# Patient Record
Sex: MALE | Race: WHITE | NOT HISPANIC OR LATINO
[De-identification: names, ages, dates, MRNs, and addresses within clinical notes are randomized per-mention and may not be internally consistent; named-entity substitution may affect disease eponyms.]

---

## 2021-02-16 ENCOUNTER — APPOINTMENT (OUTPATIENT)
Dept: INTERNAL MEDICINE | Facility: CLINIC | Age: 41
End: 2021-02-16
Payer: COMMERCIAL

## 2021-02-16 ENCOUNTER — NON-APPOINTMENT (OUTPATIENT)
Age: 41
End: 2021-02-16

## 2021-02-16 VITALS
TEMPERATURE: 97.1 F | OXYGEN SATURATION: 97 % | HEIGHT: 73 IN | BODY MASS INDEX: 26.27 KG/M2 | DIASTOLIC BLOOD PRESSURE: 85 MMHG | SYSTOLIC BLOOD PRESSURE: 133 MMHG | WEIGHT: 198.25 LBS | HEART RATE: 77 BPM

## 2021-02-16 DIAGNOSIS — E04.1 NONTOXIC SINGLE THYROID NODULE: ICD-10-CM

## 2021-02-16 DIAGNOSIS — R73.9 HYPERGLYCEMIA, UNSPECIFIED: ICD-10-CM

## 2021-02-16 DIAGNOSIS — Z83.49 FAMILY HISTORY OF OTHER ENDOCRINE, NUTRITIONAL AND METABOLIC DISEASES: ICD-10-CM

## 2021-02-16 DIAGNOSIS — E03.9 HYPOTHYROIDISM, UNSPECIFIED: ICD-10-CM

## 2021-02-16 PROCEDURE — 99385 PREV VISIT NEW AGE 18-39: CPT | Mod: 25

## 2021-02-16 PROCEDURE — 93000 ELECTROCARDIOGRAM COMPLETE: CPT

## 2021-02-16 PROCEDURE — 99072 ADDL SUPL MATRL&STAF TM PHE: CPT

## 2021-02-16 PROCEDURE — 99406 BEHAV CHNG SMOKING 3-10 MIN: CPT | Mod: NC

## 2021-02-16 PROCEDURE — 99213 OFFICE O/P EST LOW 20 MIN: CPT | Mod: 25

## 2021-02-16 PROCEDURE — 36415 COLL VENOUS BLD VENIPUNCTURE: CPT

## 2021-02-16 NOTE — HEALTH RISK ASSESSMENT
[Good] : ~his/her~  mood as  good [] : Yes [16-20] : 16-20 [Yes] : Yes [2 - 4 times a month (2 pts)] : 2-4 times a month (2 points) [3 or 4 (1 pt)] : 3 or 4  (1 point) [Never (0 pts)] : Never (0 points) [No falls in past year] : Patient reported no falls in the past year [0] : 2) Feeling down, depressed, or hopeless: Not at all (0) [Patient declined Low Dose CT Scan] : Patient declined Low Dose CT Scan [Patient declined Retinal Exam] : Patient declined Retinal Exam. [HIV Test offered] : HIV Test offered [Hepatitis C test offered] : Hepatitis C test offered [Audit-CScore] : 3 [[id number]] : 0

## 2021-02-16 NOTE — HISTORY OF PRESENT ILLNESS
[FreeTextEntry1] : physical exam [de-identified] : Mr. KAYDEN HILL is a 41 year male current everyday cigarette smoker, history of alcoholism, comes to the office for physical exam.  Patient also complains of intermittent ALBRECHT over to past few months. Patient also has additional complaints of GERD. Patient denies fever, cough. No other complaints at this time.

## 2021-02-16 NOTE — PLAN
[FreeTextEntry1] : In regards to patients Physical exam, routine blood work drawn, will review results with patient.\par \par In regards to patient's additional complaint of ALBRECHT, patient asymptomatic at time of visit, EKG performed in office was sinus rhythm, patient was referred to cardiologist for further evaluation and management\par \par In regards to patient's additional complaint of GERD, patient was started on PPI and referred to Gastroenterologist\par \par NATHALIE- patient was referred to pulmonologist\par \par Patient is not ready to quit at this time.  \par \par Counseling included abnormal lab results, differential diagnoses, treatment options, risks and benefits, lifestyle changes, current condition, medications, and dose adjustments. \par The patient was interactive, attentive, asked questions, and verbalized understanding \par \par

## 2021-02-17 LAB
25(OH)D3 SERPL-MCNC: 12 NG/ML
ALBUMIN SERPL ELPH-MCNC: 4.9 G/DL
ALP BLD-CCNC: 62 U/L
ALT SERPL-CCNC: 51 U/L
ANION GAP SERPL CALC-SCNC: 15 MMOL/L
AST SERPL-CCNC: 34 U/L
BASOPHILS # BLD AUTO: 0.03 K/UL
BASOPHILS NFR BLD AUTO: 0.5 %
BILIRUB SERPL-MCNC: 0.4 MG/DL
BUN SERPL-MCNC: 13 MG/DL
CALCIUM SERPL-MCNC: 9.8 MG/DL
CHLORIDE SERPL-SCNC: 98 MMOL/L
CHOLEST SERPL-MCNC: 245 MG/DL
CO2 SERPL-SCNC: 24 MMOL/L
CREAT SERPL-MCNC: 1.19 MG/DL
EOSINOPHIL # BLD AUTO: 0.17 K/UL
EOSINOPHIL NFR BLD AUTO: 3.1 %
ESTIMATED AVERAGE GLUCOSE: 111 MG/DL
FERRITIN SERPL-MCNC: 281 NG/ML
GLUCOSE SERPL-MCNC: 88 MG/DL
HBA1C MFR BLD HPLC: 5.5 %
HCT VFR BLD CALC: 47 %
HCV AB SER QL: NONREACTIVE
HCV S/CO RATIO: 0.05 S/CO
HDLC SERPL-MCNC: 75 MG/DL
HGB BLD-MCNC: 15.2 G/DL
HIV1+2 AB SPEC QL IA.RAPID: NONREACTIVE
IMM GRANULOCYTES NFR BLD AUTO: 0.2 %
IRON SATN MFR SERPL: 32 %
IRON SERPL-MCNC: 97 UG/DL
LDLC SERPL CALC-MCNC: 146 MG/DL
LYMPHOCYTES # BLD AUTO: 1.77 K/UL
LYMPHOCYTES NFR BLD AUTO: 31.8 %
MAN DIFF?: NORMAL
MCHC RBC-ENTMCNC: 31.1 PG
MCHC RBC-ENTMCNC: 32.3 GM/DL
MCV RBC AUTO: 96.3 FL
MONOCYTES # BLD AUTO: 0.5 K/UL
MONOCYTES NFR BLD AUTO: 9 %
NEUTROPHILS # BLD AUTO: 3.09 K/UL
NEUTROPHILS NFR BLD AUTO: 55.4 %
NONHDLC SERPL-MCNC: 170 MG/DL
PLATELET # BLD AUTO: 271 K/UL
POTASSIUM SERPL-SCNC: 4.8 MMOL/L
PROT SERPL-MCNC: 7.6 G/DL
PSA SERPL-MCNC: 0.72 NG/ML
RBC # BLD: 4.88 M/UL
RBC # FLD: 12 %
SODIUM SERPL-SCNC: 136 MMOL/L
TIBC SERPL-MCNC: 302 UG/DL
TRANSFERRIN SERPL-MCNC: 236 MG/DL
TRIGL SERPL-MCNC: 119 MG/DL
TSH SERPL-ACNC: 0.53 UIU/ML
UIBC SERPL-MCNC: 206 UG/DL
WBC # FLD AUTO: 5.57 K/UL

## 2021-03-29 ENCOUNTER — APPOINTMENT (OUTPATIENT)
Dept: CARDIOLOGY | Facility: CLINIC | Age: 41
End: 2021-03-29
Payer: COMMERCIAL

## 2021-03-29 ENCOUNTER — NON-APPOINTMENT (OUTPATIENT)
Age: 41
End: 2021-03-29

## 2021-03-29 VITALS
DIASTOLIC BLOOD PRESSURE: 87 MMHG | SYSTOLIC BLOOD PRESSURE: 138 MMHG | TEMPERATURE: 97.2 F | HEIGHT: 73 IN | RESPIRATION RATE: 15 BRPM | WEIGHT: 196 LBS | HEART RATE: 83 BPM | BODY MASS INDEX: 25.98 KG/M2

## 2021-03-29 DIAGNOSIS — Z82.49 FAMILY HISTORY OF ISCHEMIC HEART DISEASE AND OTHER DISEASES OF THE CIRCULATORY SYSTEM: ICD-10-CM

## 2021-03-29 DIAGNOSIS — Z72.89 OTHER PROBLEMS RELATED TO LIFESTYLE: ICD-10-CM

## 2021-03-29 DIAGNOSIS — E78.9 DISORDER OF LIPOPROTEIN METABOLISM, UNSPECIFIED: ICD-10-CM

## 2021-03-29 PROCEDURE — 99072 ADDL SUPL MATRL&STAF TM PHE: CPT

## 2021-03-29 PROCEDURE — 99244 OFF/OP CNSLTJ NEW/EST MOD 40: CPT

## 2021-03-29 PROCEDURE — 93000 ELECTROCARDIOGRAM COMPLETE: CPT

## 2021-03-29 RX ORDER — OMEPRAZOLE 40 MG/1
40 CAPSULE, DELAYED RELEASE ORAL
Qty: 90 | Refills: 3 | Status: DISCONTINUED | COMMUNITY
Start: 2021-02-16 | End: 2021-03-29

## 2021-03-29 RX ORDER — ERGOCALCIFEROL 1.25 MG/1
1.25 MG CAPSULE, LIQUID FILLED ORAL
Qty: 12 | Refills: 3 | Status: DISCONTINUED | COMMUNITY
Start: 2021-02-17 | End: 2021-03-29

## 2021-03-29 NOTE — DISCUSSION/SUMMARY
[FreeTextEntry1] : Patient is a 40yo M with h/o tobacco dependence, EtOH here for cardiac evaluation of dyspnea on exertion. Needs cardiac eval to ensure no underlying structural heart disease, ischemia, or ASD given iRBBB. Could also be related to significant NATHALIE, he is getting tested for this as well. Pulm eval pending\par \par 1. Echo and EST to evaluate dyspnea/chest discomfort\par 2. Pulm eval and sleep study pending\par 3. Counselled for more than 3 minutes on smoking cessation\par 4. EtOH cessation\par 5. Recommend aggressive diet and lifestyle modifications for elevated BP and lipids \par 6. Follow up after testing

## 2021-03-29 NOTE — HISTORY OF PRESENT ILLNESS
[FreeTextEntry1] : Patient is a 40yo M with h/o tobacco dependence, EtOH here for cardiac evaluation of dyspnea on exertion. Started a few months back. Feels SOB walking his dog fairly short distances or going up stairs. Gets some discomfort in chest, has hard time describing and somewhat vague. Loud snoring at night as well. States girlfriend has noted he stops breathing. Patient denies PND/orthopnea/edema/palpitations/syncope/claudication. Has morning cough. All symptoms seemed to have started this past year. HAs had both doses COVID vaccine. \par \par Works as . Drinks several drinks (beer)  about 5 days per week. \par \par ROS: GI negative, all others negative

## 2021-03-31 ENCOUNTER — APPOINTMENT (OUTPATIENT)
Dept: PULMONOLOGY | Facility: CLINIC | Age: 41
End: 2021-03-31
Payer: COMMERCIAL

## 2021-03-31 VITALS
DIASTOLIC BLOOD PRESSURE: 78 MMHG | RESPIRATION RATE: 14 BRPM | HEIGHT: 73 IN | OXYGEN SATURATION: 97 % | HEART RATE: 74 BPM | SYSTOLIC BLOOD PRESSURE: 128 MMHG | WEIGHT: 196 LBS | BODY MASS INDEX: 25.98 KG/M2 | TEMPERATURE: 97.4 F

## 2021-03-31 DIAGNOSIS — Z86.39 PERSONAL HISTORY OF OTHER ENDOCRINE, NUTRITIONAL AND METABOLIC DISEASE: ICD-10-CM

## 2021-03-31 DIAGNOSIS — Z80.8 FAMILY HISTORY OF MALIGNANT NEOPLASM OF OTHER ORGANS OR SYSTEMS: ICD-10-CM

## 2021-03-31 PROCEDURE — 99072 ADDL SUPL MATRL&STAF TM PHE: CPT

## 2021-03-31 PROCEDURE — 99204 OFFICE O/P NEW MOD 45 MIN: CPT | Mod: 25

## 2021-03-31 PROCEDURE — 99406 BEHAV CHNG SMOKING 3-10 MIN: CPT

## 2021-03-31 NOTE — HISTORY OF PRESENT ILLNESS
[Current] : current [Never] : never [Initial Evaluation] : an initial evaluation of [Excessive Daytime Sleepiness] : excessive daytime sleepiness [Witnessed Apnea During Sleep] : witnessed apnea during sleep [Witnessed Gasping During Sleep] : witnessed gasping during sleep [Snoring] : snoring [Unrefreshing Sleep] : unrefreshing sleep [Sleepy When Sedentary] : sleepy when sedentary [Currently Experiencing] : The patient is currently experiencing symptoms. [None] : The patient is not currently being treated for this problem [TextBox_4] : Patient c/o SOBOE but is otherwise without associated respiratory complaints. \par He is a current 3 ppw smoker for 20 years but has quit intermittently. \par He c/o AM cough and feels he may have PNDS.\par  [TextBox_11] : .5 [TextBox_13] : 20

## 2021-03-31 NOTE — PHYSICAL EXAM
[No Acute Distress] : no acute distress [Well Nourished] : well nourished [Well Developed] : well developed [Normal Appearance] : normal appearance [Supple] : supple [Normal Rate/Rhythm] : normal rate/rhythm [Normal S1, S2] : normal s1, s2 [No Murmurs] : no murmurs [No Resp Distress] : no resp distress [No Acc Muscle Use] : no acc muscle use [Normal Rhythm and Effort] : normal rhythm and effort [Clear to Auscultation Bilaterally] : clear to auscultation bilaterally [No Abnormalities] : no abnormalities [Benign] : benign [Not Tender] : not tender [Soft] : soft [Normal Gait] : normal gait [No Clubbing] : no clubbing [No Edema] : no edema [No Focal Deficits] : no focal deficits [Oriented x3] : oriented x3

## 2021-03-31 NOTE — REVIEW OF SYSTEMS
[Postnasal Drip] : postnasal drip [SOB on Exertion] : sob on exertion [Seasonal Allergies] : seasonal allergies [GERD] : gerd [Fever] : no fever [Chills] : no chills [Nasal Congestion] : no nasal congestion [Sinus Problems] : no sinus problems [Cough] : no cough [Chest Tightness] : no chest tightness [Sputum] : no sputum [Dyspnea] : no dyspnea [Pleuritic Pain] : no pleuritic pain [Wheezing] : no wheezing [Chest Discomfort] : no chest discomfort [Edema] : no edema [Palpitations] : no palpitations [Syncope] : no syncope [Hay Fever] : no hay fever [Abdominal Pain] : no abdominal pain [Nausea] : no nausea [Vomiting] : no vomiting [Diarrhea] : no diarrhea [Constipation] : no constipation [Back Pain] : no back pain [Anemia] : no anemia [Headache] : no headache [Seizures] : no seizures [Dizziness] : no dizziness [Numbness] : no numbness [Paralysis] : no paralysis [Confusion] : no confusion [Depression] : no depression [Anxiety] : no anxiety [Diabetes] : no diabetes [Thyroid Problem] : no thyroid problem

## 2021-03-31 NOTE — CONSULT LETTER
[Dear  ___] : Dear  [unfilled], [Consult Letter:] : I had the pleasure of evaluating your patient, [unfilled]. [Please see my note below.] : Please see my note below. [Consult Closing:] : Thank you very much for allowing me to participate in the care of this patient.  If you have any questions, please do not hesitate to contact me. [Sincerely,] : Sincerely, [FreeTextEntry3] : Rishi Godinez MD, FCCP, D. ABSM\par Pulmonary and Sleep Medicine\par [facility] at [place]

## 2021-03-31 NOTE — COUNSELING
[Potential consequences of obesity discussed] : Potential consequences of obesity discussed [Benefits of weight loss discussed] : Benefits of weight loss discussed [Encouraged to increase physical activity] : Encouraged to increase physical activity [Cessation strategies including cessation program discussed] : Cessation strategies including cessation program discussed [Risk of tobacco use and health benefits of smoking cessation discussed] : Risk of tobacco use and health benefits of smoking cessation discussed [Use of nicotine replacement therapies and other medications discussed] : Use of nicotine replacement therapies and other medications discussed [Encouraged to pick a quit date and identify support needed to quit] : Encouraged to pick a quit date and identify support needed to quit [Tobacco Use Cessation Intermediate Greater Than 3 Minutes Up to 10 Minutes] : Tobacco Use Cessation Intermediate Greater Than 3 Minutes Up to 10 Minutes [FreeTextEntry1] : 4

## 2021-03-31 NOTE — DISCUSSION/SUMMARY
[FreeTextEntry1] : \par #1. Will schedule PFTs in near future to assess lung function with Covid testing prior to PFTs\par #2. The patient does not appear to require chronic BD therapy at this time\par #3. Diet and exercise for weight loss\par #4. SOBOE is likely related to weight or deconditioning \par #5. CXR to evaluate SOBOE \par #6. PSG to evaluate for possible NATHALIE. Consider HST if not approved. \par #7. F/u after PSG/HST with CXR and PFTs\par #8. Flonase nasal spray for post nasal drip as needed \par #9. Pt had both Covid vaccines\par #10. Reviewed risks of exposure and symptoms of Covid-19 virus, including how the virus is spread and precautions to avoid dale virus.\par \par Patient's questions were answered and patient appears to understand these recommendations

## 2021-04-16 ENCOUNTER — OUTPATIENT (OUTPATIENT)
Dept: OUTPATIENT SERVICES | Facility: HOSPITAL | Age: 41
LOS: 1 days | End: 2021-04-16
Payer: COMMERCIAL

## 2021-04-16 DIAGNOSIS — G47.33 OBSTRUCTIVE SLEEP APNEA (ADULT) (PEDIATRIC): ICD-10-CM

## 2021-04-16 PROCEDURE — 95810 POLYSOM 6/> YRS 4/> PARAM: CPT | Mod: 26

## 2021-04-16 PROCEDURE — 95810 POLYSOM 6/> YRS 4/> PARAM: CPT

## 2021-04-23 ENCOUNTER — OUTPATIENT (OUTPATIENT)
Dept: OUTPATIENT SERVICES | Facility: HOSPITAL | Age: 41
LOS: 1 days | End: 2021-04-23
Payer: COMMERCIAL

## 2021-04-23 ENCOUNTER — APPOINTMENT (OUTPATIENT)
Dept: RADIOLOGY | Facility: CLINIC | Age: 41
End: 2021-04-23

## 2021-04-23 ENCOUNTER — APPOINTMENT (OUTPATIENT)
Dept: DISASTER EMERGENCY | Facility: CLINIC | Age: 41
End: 2021-04-23

## 2021-04-23 DIAGNOSIS — R06.00 DYSPNEA, UNSPECIFIED: ICD-10-CM

## 2021-04-23 PROCEDURE — 71046 X-RAY EXAM CHEST 2 VIEWS: CPT

## 2021-04-23 PROCEDURE — 71046 X-RAY EXAM CHEST 2 VIEWS: CPT | Mod: 26

## 2021-04-25 LAB — SARS-COV-2 N GENE NPH QL NAA+PROBE: NOT DETECTED

## 2021-04-26 ENCOUNTER — APPOINTMENT (OUTPATIENT)
Dept: PULMONOLOGY | Facility: CLINIC | Age: 41
End: 2021-04-26
Payer: COMMERCIAL

## 2021-04-26 ENCOUNTER — NON-APPOINTMENT (OUTPATIENT)
Age: 41
End: 2021-04-26

## 2021-04-26 VITALS — TEMPERATURE: 97.6 F | HEIGHT: 72.5 IN | BODY MASS INDEX: 26.39 KG/M2 | WEIGHT: 197 LBS

## 2021-04-26 VITALS
SYSTOLIC BLOOD PRESSURE: 115 MMHG | HEART RATE: 57 BPM | DIASTOLIC BLOOD PRESSURE: 60 MMHG | RESPIRATION RATE: 16 BRPM | OXYGEN SATURATION: 97 %

## 2021-04-26 PROCEDURE — 94010 BREATHING CAPACITY TEST: CPT

## 2021-04-26 PROCEDURE — 94729 DIFFUSING CAPACITY: CPT

## 2021-04-26 PROCEDURE — 99072 ADDL SUPL MATRL&STAF TM PHE: CPT

## 2021-04-26 PROCEDURE — 99214 OFFICE O/P EST MOD 30 MIN: CPT | Mod: 25

## 2021-04-26 PROCEDURE — 94727 GAS DIL/WSHOT DETER LNG VOL: CPT

## 2021-04-26 PROCEDURE — 85018 HEMOGLOBIN: CPT | Mod: QW

## 2021-04-26 NOTE — CONSULT LETTER
[Dear  ___] : Dear  [unfilled], [Consult Letter:] : I had the pleasure of evaluating your patient, [unfilled]. [Please see my note below.] : Please see my note below. [Consult Closing:] : Thank you very much for allowing me to participate in the care of this patient.  If you have any questions, please do not hesitate to contact me. [Sincerely,] : Sincerely, [FreeTextEntry3] : Rishi Godinez MD, FCCP, D. ABSM\par Pulmonary and Sleep Medicine\par [facility] at [place]

## 2021-04-26 NOTE — DISCUSSION/SUMMARY
[FreeTextEntry1] : \par #1. PFTs performed today are essentially normal.\par #2. The patient does not appear to require chronic BD therapy at this time\par #3. SOBOE is likely related to weight or deconditioning given normal PFTs\par #4. Diet and exercise for weight loss \par #5. CXR to evaluate SOBOE was clear\par #6. Start autoCPAP therapy to treat mild NATHALIE with an AHI of 5.2; could consider therapy for PLMD though denies significant RLS symptoms though most PLMs were not associated with arousals\par #7. F/u one month after starting CPAP therapy \par #8. Flonase nasal spray for post nasal drip as needed \par #9. Pt had both Covid vaccines\par #10. Stressed importance of complete smoking cessation\par #11. Reviewed risks of exposure and symptoms of Covid-19 virus, including how the virus is spread and precautions to avoid dale virus.\par \par Patient's questions were answered and patient appears to understand these recommendations

## 2021-04-26 NOTE — HISTORY OF PRESENT ILLNESS
[Current] : current [Never] : never [Follow-Up - Routine Clinic] : a routine clinic follow-up of [Excessive Daytime Sleepiness] : excessive daytime sleepiness [Witnessed Apnea During Sleep] : witnessed apnea during sleep [Witnessed Gasping During Sleep] : witnessed gasping during sleep [Snoring] : snoring [Unrefreshing Sleep] : unrefreshing sleep [Sleepy When Sedentary] : sleepy when sedentary [Currently Experiencing] : The patient is currently experiencing symptoms. [None] : The patient is not currently being treated for this problem [TextBox_4] : Patient c/o SOBOE but is otherwise without associated respiratory complaints. \par He is a current 3 ppw smoker for 20 years but has quit intermittently. \par He c/o AM cough and feels he may have PNDS.\par  [TextBox_11] : .5 [TextBox_13] : 20

## 2021-04-26 NOTE — COUNSELING
[Risk of tobacco use and health benefits of smoking cessation discussed] : Risk of tobacco use and health benefits of smoking cessation discussed [Cessation strategies including cessation program discussed] : Cessation strategies including cessation program discussed [Use of nicotine replacement therapies and other medications discussed] : Use of nicotine replacement therapies and other medications discussed [Encouraged to pick a quit date and identify support needed to quit] : Encouraged to pick a quit date and identify support needed to quit [Potential consequences of obesity discussed] : Potential consequences of obesity discussed [Benefits of weight loss discussed] : Benefits of weight loss discussed [Encouraged to increase physical activity] : Encouraged to increase physical activity [FreeTextEntry1] : 4

## 2021-05-17 ENCOUNTER — APPOINTMENT (OUTPATIENT)
Dept: CARDIOLOGY | Facility: CLINIC | Age: 41
End: 2021-05-17
Payer: COMMERCIAL

## 2021-05-17 PROCEDURE — 93306 TTE W/DOPPLER COMPLETE: CPT

## 2021-05-17 PROCEDURE — 99072 ADDL SUPL MATRL&STAF TM PHE: CPT

## 2021-05-17 PROCEDURE — 93015 CV STRESS TEST SUPVJ I&R: CPT

## 2021-05-26 ENCOUNTER — APPOINTMENT (OUTPATIENT)
Dept: CARDIOLOGY | Facility: CLINIC | Age: 41
End: 2021-05-26
Payer: COMMERCIAL

## 2021-05-26 VITALS
DIASTOLIC BLOOD PRESSURE: 77 MMHG | WEIGHT: 195 LBS | SYSTOLIC BLOOD PRESSURE: 123 MMHG | TEMPERATURE: 97.9 F | OXYGEN SATURATION: 97 % | HEIGHT: 73 IN | HEART RATE: 69 BPM | BODY MASS INDEX: 25.84 KG/M2

## 2021-05-26 PROCEDURE — 99214 OFFICE O/P EST MOD 30 MIN: CPT

## 2021-05-26 PROCEDURE — 99072 ADDL SUPL MATRL&STAF TM PHE: CPT

## 2021-05-26 NOTE — DISCUSSION/SUMMARY
[FreeTextEntry1] : Patient is a 42yo M with h/o tobacco dependence, EtOH here for cardiac follow up of dyspnea . \par -EST negative, excellent functional capacity\par -UNremarkable echo other than focal calcification of aortic valve and mild MR. Surveillance only\par \par 1. Recommend 30 minutes moderate intensity aerobic activity 5 days per week \par 2. Recommend 30 minutes moderate intensity aerobic activity 5 days per week \par 3. Counselled for more than 3 minutes on smoking cessation\par 4. EtOH cessation\par 5. annual follow up \par 6. Regular PMd and pulm follow up

## 2021-05-26 NOTE — HISTORY OF PRESENT ILLNESS
[FreeTextEntry1] : Patient is a 42yo M with h/o tobacco dependence, EtOH here for cardiac follow upof dyspnea on exertion. Started a few months back. Feels SOB walking his dog fairly short distances or going up stairs. Gets some discomfort in chest, has hard time describing and somewhat vague. Loud snoring at night as well. States girlfriend has noted he stops breathing. Patient denies PND/orthopnea/edema/palpitations/syncope/claudication. Has morning cough. All symptoms seemed to have started this past year. HAs had both doses COVID vaccine. Patient underwent cardiac testing after last visit. STarted on CPAP as well. \par \par Works as . Drinks several drinks (beer)  about 5 days per week. \par \par ROS: GI and  negative

## 2021-06-15 ENCOUNTER — APPOINTMENT (OUTPATIENT)
Dept: PULMONOLOGY | Facility: CLINIC | Age: 41
End: 2021-06-15
Payer: COMMERCIAL

## 2021-06-15 VITALS
WEIGHT: 190 LBS | BODY MASS INDEX: 25.18 KG/M2 | TEMPERATURE: 97.4 F | DIASTOLIC BLOOD PRESSURE: 80 MMHG | SYSTOLIC BLOOD PRESSURE: 130 MMHG | OXYGEN SATURATION: 97 % | HEART RATE: 67 BPM | HEIGHT: 73 IN

## 2021-06-15 PROCEDURE — 99072 ADDL SUPL MATRL&STAF TM PHE: CPT

## 2021-06-15 PROCEDURE — 99406 BEHAV CHNG SMOKING 3-10 MIN: CPT

## 2021-06-15 PROCEDURE — 99214 OFFICE O/P EST MOD 30 MIN: CPT | Mod: 25

## 2021-06-15 NOTE — CONSULT LETTER
[Dear  ___] : Dear  [unfilled], [Consult Letter:] : I had the pleasure of evaluating your patient, [unfilled]. [Please see my note below.] : Please see my note below. [Consult Closing:] : Thank you very much for allowing me to participate in the care of this patient.  If you have any questions, please do not hesitate to contact me. [Sincerely,] : Sincerely, [FreeTextEntry3] : Rishi Godinez MD, FCCP, D. ABSM\par Pulmonary and Sleep Medicine\par [facility] at [place]

## 2021-06-15 NOTE — COUNSELING
[Risk of tobacco use and health benefits of smoking cessation discussed] : Risk of tobacco use and health benefits of smoking cessation discussed [Cessation strategies including cessation program discussed] : Cessation strategies including cessation program discussed [Use of nicotine replacement therapies and other medications discussed] : Use of nicotine replacement therapies and other medications discussed [Encouraged to pick a quit date and identify support needed to quit] : Encouraged to pick a quit date and identify support needed to quit [Tobacco Use Cessation Intermediate Greater Than 3 Minutes Up to 10 Minutes] : Tobacco Use Cessation Intermediate Greater Than 3 Minutes Up to 10 Minutes [Potential consequences of obesity discussed] : Potential consequences of obesity discussed [Benefits of weight loss discussed] : Benefits of weight loss discussed [Encouraged to increase physical activity] : Encouraged to increase physical activity [FreeTextEntry1] : 4

## 2021-06-15 NOTE — DISCUSSION/SUMMARY
[FreeTextEntry1] : \par #1. PFTs performed previously were essentially normal.\par #2. The patient does not appear to require chronic BD therapy at this time\par #3. SOBOE is likely related to weight or deconditioning given normal PFTs\par #4. Diet and exercise for weight loss \par #5. CXR to evaluate SOBOE was clear\par #6. Continue autoCPAP therapy to treat mild NATHALIE with an AHI of 5.2; could consider therapy for PLMD though denies significant RLS symptoms though most PLMs were not associated with arousals\par #7. F/u 3 months with compliance  \par #8. Flonase nasal spray for post nasal drip as needed \par #9. Pt had both Covid vaccines\par #10. Stressed importance of complete smoking cessation\par #11. Reviewed risks of exposure and symptoms of Covid-19 virus, including how the virus is spread and precautions to avoid dale virus.\par \par Patient's questions were answered and patient appears to understand these recommendations

## 2021-06-17 ENCOUNTER — APPOINTMENT (OUTPATIENT)
Dept: GASTROENTEROLOGY | Facility: CLINIC | Age: 41
End: 2021-06-17
Payer: COMMERCIAL

## 2021-06-17 VITALS
HEIGHT: 73 IN | SYSTOLIC BLOOD PRESSURE: 128 MMHG | DIASTOLIC BLOOD PRESSURE: 82 MMHG | TEMPERATURE: 97.5 F | OXYGEN SATURATION: 98 % | BODY MASS INDEX: 25.45 KG/M2 | HEART RATE: 68 BPM | WEIGHT: 192 LBS | RESPIRATION RATE: 14 BRPM

## 2021-06-17 DIAGNOSIS — Z78.9 OTHER SPECIFIED HEALTH STATUS: ICD-10-CM

## 2021-06-17 PROCEDURE — 99072 ADDL SUPL MATRL&STAF TM PHE: CPT

## 2021-06-17 PROCEDURE — 99204 OFFICE O/P NEW MOD 45 MIN: CPT

## 2021-06-17 NOTE — PHYSICAL EXAM
[General Appearance - Alert] : alert [General Appearance - In No Acute Distress] : in no acute distress [Sclera] : the sclera and conjunctiva were normal [PERRL With Normal Accommodation] : pupils were equal in size, round, and reactive to light [Extraocular Movements] : extraocular movements were intact [Outer Ear] : the ears and nose were normal in appearance [Oropharynx] : the oropharynx was normal [Neck Appearance] : the appearance of the neck was normal [Jugular Venous Distention Increased] : there was no jugular-venous distention [Neck Cervical Mass (___cm)] : no neck mass was observed [Thyroid Diffuse Enlargement] : the thyroid was not enlarged [Thyroid Nodule] : there were no palpable thyroid nodules [Auscultation Breath Sounds / Voice Sounds] : lungs were clear to auscultation bilaterally [Heart Rate And Rhythm] : heart rate was normal and rhythm regular [Heart Sounds] : normal S1 and S2 [Heart Sounds Gallop] : no gallops [Heart Sounds Pericardial Friction Rub] : no pericardial rub [Murmurs] : no murmurs [Bowel Sounds] : normal bowel sounds [Abdomen Soft] : soft [] : no hepato-splenomegaly [Abdomen Tenderness] : non-tender [Abdomen Mass (___ Cm)] : no abdominal mass palpated [Nail Clubbing] : no clubbing  or cyanosis of the fingernails [Skin Color & Pigmentation] : normal skin color and pigmentation [Skin Turgor] : normal skin turgor [No Focal Deficits] : no focal deficits [Oriented To Time, Place, And Person] : oriented to person, place, and time [Impaired Insight] : insight and judgment were intact [Affect] : the affect was normal

## 2021-06-17 NOTE — HISTORY OF PRESENT ILLNESS
[de-identified] : This is a 41-year-old man presenting for evaluation for uncontrolled GERD and heartburn.  Patient reports a long history of acid reflux and underwent an EGD approximately 10 years ago that he reports was unremarkable.  He reports nearly having symptoms daily, is aware that spicy foods and alcohol are triggers.  He was prescribed omeprazole by his primary care doctor, which he never took.  He denies any unintentional weight loss, rectal bleeding, dysphagia, or melena.

## 2021-06-17 NOTE — ASSESSMENT
[FreeTextEntry1] : Counseled patient to quit smoking.  Advised that he start taking pantoprazole 40 mg once daily, and schedule EGD to rule out Pascual's esophagus.

## 2021-07-29 ENCOUNTER — TRANSCRIPTION ENCOUNTER (OUTPATIENT)
Age: 41
End: 2021-07-29

## 2021-07-30 ENCOUNTER — APPOINTMENT (OUTPATIENT)
Dept: GASTROENTEROLOGY | Facility: GI CENTER | Age: 41
End: 2021-07-30
Payer: COMMERCIAL

## 2021-07-30 ENCOUNTER — OUTPATIENT (OUTPATIENT)
Dept: OUTPATIENT SERVICES | Facility: HOSPITAL | Age: 41
LOS: 1 days | End: 2021-07-30
Payer: COMMERCIAL

## 2021-07-30 ENCOUNTER — RESULT REVIEW (OUTPATIENT)
Age: 41
End: 2021-07-30

## 2021-07-30 DIAGNOSIS — K29.80 DUODENITIS W/OUT BLEEDING: ICD-10-CM

## 2021-07-30 DIAGNOSIS — R10.13 EPIGASTRIC PAIN: ICD-10-CM

## 2021-07-30 DIAGNOSIS — K20.80 OTHER ESOPHAGITIS WITHOUT BLEEDING: ICD-10-CM

## 2021-07-30 PROCEDURE — 43239 EGD BIOPSY SINGLE/MULTIPLE: CPT

## 2021-07-30 PROCEDURE — 88342 IMHCHEM/IMCYTCHM 1ST ANTB: CPT | Mod: 26

## 2021-07-30 PROCEDURE — 88305 TISSUE EXAM BY PATHOLOGIST: CPT | Mod: 26

## 2021-07-30 PROCEDURE — 88305 TISSUE EXAM BY PATHOLOGIST: CPT

## 2021-07-30 PROCEDURE — 88342 IMHCHEM/IMCYTCHM 1ST ANTB: CPT

## 2021-08-04 LAB — SURGICAL PATHOLOGY STUDY: SIGNIFICANT CHANGE UP

## 2021-09-22 ENCOUNTER — APPOINTMENT (OUTPATIENT)
Dept: PULMONOLOGY | Facility: CLINIC | Age: 41
End: 2021-09-22
Payer: COMMERCIAL

## 2021-09-22 VITALS
OXYGEN SATURATION: 97 % | BODY MASS INDEX: 26.39 KG/M2 | SYSTOLIC BLOOD PRESSURE: 120 MMHG | HEART RATE: 62 BPM | WEIGHT: 200 LBS | DIASTOLIC BLOOD PRESSURE: 60 MMHG

## 2021-09-22 PROCEDURE — 99214 OFFICE O/P EST MOD 30 MIN: CPT | Mod: 25

## 2021-09-22 PROCEDURE — 99406 BEHAV CHNG SMOKING 3-10 MIN: CPT

## 2021-09-22 NOTE — CONSULT LETTER
[Dear  ___] : Dear  [unfilled], [Consult Letter:] : I had the pleasure of evaluating your patient, [unfilled]. [Please see my note below.] : Please see my note below. [Consult Closing:] : Thank you very much for allowing me to participate in the care of this patient.  If you have any questions, please do not hesitate to contact me. [Sincerely,] : Sincerely, [FreeTextEntry3] : Rishi Godinez MD, FCCP, D. ABSM\par Pulmonary and Sleep Medicine\par [facility] at [place]

## 2021-09-22 NOTE — DISCUSSION/SUMMARY
[FreeTextEntry1] : \par #1. PFTs performed previously were essentially normal.\par #2. The patient does not appear to require chronic BD therapy at this time\par #3. SOBOE is likely related to weight or deconditioning given normal PFTs\par #4. Diet and exercise for weight loss \par #5. CXR to evaluate SOBOE was clear\par #6. Continue autoCPAP therapy to treat mild NATHALIE with an AHI of 5.2; could consider therapy for PLMD though denies significant RLS symptoms though most PLMs were not associated with arousals\par #7. F/u 4 months with compliance; encouraged improved compliance\par #8. Flonase nasal spray for post nasal drip as needed \par #9. Pt had both Covid vaccines\par #10. Stressed importance of complete smoking cessation\par #11. Reviewed risks of exposure and symptoms of Covid-19 virus, including how the virus is spread and precautions to avoid dale virus.\par \par Patient's questions were answered and patient appears to understand these recommendations

## 2021-09-22 NOTE — REASON FOR VISIT
[Follow-Up] : a follow-up visit [Sleep Apnea] : sleep apnea [Shortness of Breath] : shortness of breath [Nicotine Dependence] : nicotine dependence

## 2021-09-22 NOTE — COUNSELING
[Risk of tobacco use and health benefits of smoking cessation discussed] : Risk of tobacco use and health benefits of smoking cessation discussed [Cessation strategies including cessation program discussed] : Cessation strategies including cessation program discussed [Use of nicotine replacement therapies and other medications discussed] : Use of nicotine replacement therapies and other medications discussed [Encouraged to pick a quit date and identify support needed to quit] : Encouraged to pick a quit date and identify support needed to quit [Potential consequences of obesity discussed] : Potential consequences of obesity discussed [Benefits of weight loss discussed] : Benefits of weight loss discussed [Encouraged to increase physical activity] : Encouraged to increase physical activity [Tobacco Use Cessation Intermediate Greater Than 3 Minutes Up to 10 Minutes] : Tobacco Use Cessation Intermediate Greater Than 3 Minutes Up to 10 Minutes [FreeTextEntry1] : 4

## 2021-09-22 NOTE — PHYSICAL EXAM
[No Acute Distress] : no acute distress [Well Nourished] : well nourished [Well Developed] : well developed [Normal Appearance] : normal appearance [Supple] : supple [Normal Rate/Rhythm] : normal rate/rhythm [Normal S1, S2] : normal s1, s2 [No Murmurs] : no murmurs [No Resp Distress] : no resp distress [No Acc Muscle Use] : no acc muscle use [Normal Rhythm and Effort] : normal rhythm and effort [Clear to Auscultation Bilaterally] : clear to auscultation bilaterally [No Abnormalities] : no abnormalities [Benign] : benign [Not Tender] : not tender [Soft] : soft [No Clubbing] : no clubbing [Normal Gait] : normal gait [No Edema] : no edema [No Focal Deficits] : no focal deficits [Oriented x3] : oriented x3

## 2022-01-20 ENCOUNTER — APPOINTMENT (OUTPATIENT)
Dept: PULMONOLOGY | Facility: CLINIC | Age: 42
End: 2022-01-20

## 2022-05-23 ENCOUNTER — NON-APPOINTMENT (OUTPATIENT)
Age: 42
End: 2022-05-23

## 2022-05-23 ENCOUNTER — APPOINTMENT (OUTPATIENT)
Dept: CARDIOLOGY | Facility: CLINIC | Age: 42
End: 2022-05-23
Payer: COMMERCIAL

## 2022-05-23 VITALS
HEART RATE: 64 BPM | DIASTOLIC BLOOD PRESSURE: 64 MMHG | SYSTOLIC BLOOD PRESSURE: 122 MMHG | BODY MASS INDEX: 26.24 KG/M2 | RESPIRATION RATE: 16 BRPM | OXYGEN SATURATION: 97 % | HEIGHT: 73 IN | WEIGHT: 198 LBS

## 2022-05-23 DIAGNOSIS — Z82.49 FAMILY HISTORY OF ISCHEMIC HEART DISEASE AND OTHER DISEASES OF THE CIRCULATORY SYSTEM: ICD-10-CM

## 2022-05-23 DIAGNOSIS — I35.9 NONRHEUMATIC AORTIC VALVE DISORDER, UNSPECIFIED: ICD-10-CM

## 2022-05-23 PROCEDURE — 93000 ELECTROCARDIOGRAM COMPLETE: CPT

## 2022-05-23 PROCEDURE — 99214 OFFICE O/P EST MOD 30 MIN: CPT

## 2022-05-23 RX ORDER — PANTOPRAZOLE 40 MG/1
40 TABLET, DELAYED RELEASE ORAL
Qty: 1 | Refills: 3 | Status: DISCONTINUED | COMMUNITY
Start: 2021-06-17 | End: 2022-05-23

## 2022-05-23 NOTE — DISCUSSION/SUMMARY
[FreeTextEntry1] : Patient is a 43yo M with h/o tobacco dependence, EtOH here for cardiac follow up of dyspnea . \par -EST negative, excellent functional capacity\par -UNremarkable echo other than focal calcification of aortic valve and mild MR. Surveillance only\par \par 1. Recommend 30 minutes moderate intensity aerobic activity 5 days per week \par 2. Regular pulm follow up, not tolerating CPAP but NATHALIE only mild\par 3. Counselled for more than 3 minutes on smoking cessation\par 4. EtOH cessation\par 5. annual follow up , consider repeat echo then \par 6. High HDL and LDL, no indicaiton for statin at this time. Continue to monitor. NOt amenable to meds regardless at this time\par

## 2022-05-23 NOTE — ASSESSMENT
[FreeTextEntry1] : ECG: SR, RSR'\par \par  HDL 75   (2/2021) \par \par ECHO 5/2021:\par 1. Normal LV size and function, EF 55%\par 2. Normal RV/LA/RA \par 3. Trileaflet aortic valve, focal NCC calcification\par 4. Mild MR and PI \par \par EST 5/2021:\par 1. Negative for ischemia\par 2. Normal HR/BP response \par 3. DTS = 13, achieved 15 METS

## 2022-05-23 NOTE — HISTORY OF PRESENT ILLNESS
[FreeTextEntry1] : Patient is a 43yo M with h/o tobacco dependence, EtOH here for cardiac follow up. Patient denies PND/orthopnea/edema/palpitations/syncope/claudication. LAst year had CP that is better this year. Breathing still labored a bit though. HAd normal PFTS with pulm last year and only mild NATHALIE, CPAP not helping. \par \par Works as . Drinks several drinks (beer)  about 5 days per week. \par \par ROS: GI and  negative

## 2022-07-11 ENCOUNTER — APPOINTMENT (OUTPATIENT)
Dept: INTERNAL MEDICINE | Facility: CLINIC | Age: 42
End: 2022-07-11

## 2022-08-30 NOTE — ASSESSMENT
[FreeTextEntry1] : \par \par  HDL 75   (2/2021) \par \par ECHO 5/2021:\par 1. Normal LV size and function, EF 55%\par 2. Normal RV/LA/RA \par 3. Trileaflet aortic valve, focal NCC calcification\par 4. Mild MR and PI \par \par EST 5/2021:\par 1. NEgative for ischemia\par 2. Normal HR/BP response \par 3. DTS = 13, achieved 15 METS [place] Pregnancy And Lactation Text: This medication is not safe during pregnancy. It is unknown if this medication is excreted in breast milk.

## 2022-09-08 ENCOUNTER — APPOINTMENT (OUTPATIENT)
Dept: INTERNAL MEDICINE | Facility: CLINIC | Age: 42
End: 2022-09-08

## 2022-09-08 VITALS
SYSTOLIC BLOOD PRESSURE: 119 MMHG | WEIGHT: 194.25 LBS | HEART RATE: 67 BPM | DIASTOLIC BLOOD PRESSURE: 75 MMHG | HEIGHT: 73 IN | BODY MASS INDEX: 25.74 KG/M2

## 2022-09-08 PROCEDURE — 36415 COLL VENOUS BLD VENIPUNCTURE: CPT

## 2022-09-08 PROCEDURE — 99396 PREV VISIT EST AGE 40-64: CPT | Mod: 25

## 2022-09-08 NOTE — PLAN
[FreeTextEntry1] : In regards to patients Physical exam, routine blood work drawn, will review results with patient.\par Patient will continue to follow with cardiologist \par \par In regards to patient's GERD patient will follow with Gastroenterologist\par \par NATHALIE- patient will follow with pulmonologist\par \par Patient is not ready to quit smoking cigarettes  at this time.  \par \par Counseling included abnormal lab results, differential diagnoses, treatment options, risks and benefits, lifestyle changes, current condition, medications, and dose adjustments. \par The patient was interactive, attentive, asked questions, and verbalized understanding \par \par

## 2022-09-08 NOTE — HISTORY OF PRESENT ILLNESS
[FreeTextEntry1] : physical exam [de-identified] : Mr. KAYDEN HILL is a 42 year male current everyday cigarette smoker, history of alcoholism, comes to the office for physical exam.  Patient denies fever, cough SOB. No other complaints at this time.

## 2022-09-08 NOTE — HEALTH RISK ASSESSMENT
[Good] : ~his/her~  mood as  good [Yes] : Yes [2 - 4 times a month (2 pts)] : 2-4 times a month (2 points) [3 or 4 (1 pt)] : 3 or 4  (1 point) [Never (0 pts)] : Never (0 points) [No falls in past year] : Patient reported no falls in the past year [0] : 2) Feeling down, depressed, or hopeless: Not at all (0) [Audit-CScore] : 3 [[id number]] : 0 [Patient declined Low Dose CT Scan] : Patient declined Low Dose CT Scan [Patient declined Retinal Exam] : Patient declined Retinal Exam. [HIV Test offered] : HIV Test offered [Hepatitis C test offered] : Hepatitis C test offered

## 2022-09-09 LAB
25(OH)D3 SERPL-MCNC: 16.6 NG/ML
ALBUMIN SERPL ELPH-MCNC: 4.8 G/DL
ALP BLD-CCNC: 55 U/L
ALT SERPL-CCNC: 33 U/L
ANION GAP SERPL CALC-SCNC: 17 MMOL/L
AST SERPL-CCNC: 28 U/L
BASOPHILS # BLD AUTO: 0.03 K/UL
BASOPHILS NFR BLD AUTO: 0.5 %
BILIRUB SERPL-MCNC: 0.2 MG/DL
BUN SERPL-MCNC: 9 MG/DL
CALCIUM SERPL-MCNC: 9.2 MG/DL
CHLORIDE SERPL-SCNC: 107 MMOL/L
CHOLEST SERPL-MCNC: 201 MG/DL
CO2 SERPL-SCNC: 20 MMOL/L
CREAT SERPL-MCNC: 1.08 MG/DL
EGFR: 89 ML/MIN/1.73M2
EOSINOPHIL # BLD AUTO: 0.47 K/UL
EOSINOPHIL NFR BLD AUTO: 7.8 %
ESTIMATED AVERAGE GLUCOSE: 114 MG/DL
FOLATE SERPL-MCNC: 8 NG/ML
GLUCOSE SERPL-MCNC: 87 MG/DL
HBA1C MFR BLD HPLC: 5.6 %
HCT VFR BLD CALC: 43.7 %
HDLC SERPL-MCNC: 68 MG/DL
HGB BLD-MCNC: 14 G/DL
IMM GRANULOCYTES NFR BLD AUTO: 0.2 %
IRON SATN MFR SERPL: 52 %
IRON SERPL-MCNC: 159 UG/DL
LDLC SERPL CALC-MCNC: 105 MG/DL
LYMPHOCYTES # BLD AUTO: 2.9 K/UL
LYMPHOCYTES NFR BLD AUTO: 48 %
MAN DIFF?: NORMAL
MCHC RBC-ENTMCNC: 31.3 PG
MCHC RBC-ENTMCNC: 32 GM/DL
MCV RBC AUTO: 97.5 FL
MONOCYTES # BLD AUTO: 0.59 K/UL
MONOCYTES NFR BLD AUTO: 9.8 %
NEUTROPHILS # BLD AUTO: 2.04 K/UL
NEUTROPHILS NFR BLD AUTO: 33.7 %
NONHDLC SERPL-MCNC: 133 MG/DL
PLATELET # BLD AUTO: 301 K/UL
POTASSIUM SERPL-SCNC: 4.3 MMOL/L
PROT SERPL-MCNC: 6.8 G/DL
PSA SERPL-MCNC: 0.87 NG/ML
RBC # BLD: 4.48 M/UL
RBC # FLD: 12.8 %
SODIUM SERPL-SCNC: 144 MMOL/L
TIBC SERPL-MCNC: 304 UG/DL
TRIGL SERPL-MCNC: 138 MG/DL
TSH SERPL-ACNC: 0.6 UIU/ML
UIBC SERPL-MCNC: 145 UG/DL
VIT B12 SERPL-MCNC: 217 PG/ML
WBC # FLD AUTO: 6.04 K/UL

## 2022-09-09 RX ORDER — ERGOCALCIFEROL 1.25 MG/1
1.25 MG CAPSULE ORAL
Qty: 12 | Refills: 2 | Status: ACTIVE | COMMUNITY
Start: 2022-09-09 | End: 1900-01-01

## 2023-03-03 ENCOUNTER — INPATIENT (INPATIENT)
Facility: HOSPITAL | Age: 43
LOS: 5 days | Discharge: HOME | DRG: 885 | End: 2023-03-09
Attending: PSYCHIATRY & NEUROLOGY | Admitting: PSYCHIATRY & NEUROLOGY
Payer: COMMERCIAL

## 2023-03-03 VITALS — WEIGHT: 195.11 LBS | HEIGHT: 73 IN

## 2023-03-03 DIAGNOSIS — F14.10 COCAINE ABUSE, UNCOMPLICATED: ICD-10-CM

## 2023-03-03 DIAGNOSIS — F10.10 ALCOHOL ABUSE, UNCOMPLICATED: ICD-10-CM

## 2023-03-03 DIAGNOSIS — F33.2 MAJOR DEPRESSIVE DISORDER, RECURRENT SEVERE WITHOUT PSYCHOTIC FEATURES: ICD-10-CM

## 2023-03-03 DIAGNOSIS — F32.9 MAJOR DEPRESSIVE DISORDER, SINGLE EPISODE, UNSPECIFIED: ICD-10-CM

## 2023-03-03 LAB
ALBUMIN SERPL ELPH-MCNC: 3.4 G/DL — SIGNIFICANT CHANGE UP (ref 3.3–5)
ALP SERPL-CCNC: 59 U/L — SIGNIFICANT CHANGE UP (ref 40–120)
ALT FLD-CCNC: 33 U/L — SIGNIFICANT CHANGE UP (ref 12–78)
AMPHET UR-MCNC: NEGATIVE — SIGNIFICANT CHANGE UP
ANION GAP SERPL CALC-SCNC: 3 MMOL/L — LOW (ref 5–17)
APAP SERPL-MCNC: <2 UG/ML — LOW (ref 10–30)
APPEARANCE UR: CLEAR — SIGNIFICANT CHANGE UP
AST SERPL-CCNC: 46 U/L — HIGH (ref 15–37)
BARBITURATES UR SCN-MCNC: NEGATIVE — SIGNIFICANT CHANGE UP
BASOPHILS # BLD AUTO: 0.04 K/UL — SIGNIFICANT CHANGE UP (ref 0–0.2)
BASOPHILS NFR BLD AUTO: 0.8 % — SIGNIFICANT CHANGE UP (ref 0–2)
BENZODIAZ UR-MCNC: NEGATIVE — SIGNIFICANT CHANGE UP
BILIRUB SERPL-MCNC: 0.5 MG/DL — SIGNIFICANT CHANGE UP (ref 0.2–1.2)
BILIRUB UR-MCNC: NEGATIVE — SIGNIFICANT CHANGE UP
BUN SERPL-MCNC: 10 MG/DL — SIGNIFICANT CHANGE UP (ref 7–23)
CALCIUM SERPL-MCNC: 8.8 MG/DL — SIGNIFICANT CHANGE UP (ref 8.5–10.1)
CHLORIDE SERPL-SCNC: 106 MMOL/L — SIGNIFICANT CHANGE UP (ref 96–108)
CO2 SERPL-SCNC: 30 MMOL/L — SIGNIFICANT CHANGE UP (ref 22–31)
COCAINE METAB.OTHER UR-MCNC: POSITIVE — SIGNIFICANT CHANGE UP
COLOR SPEC: YELLOW — SIGNIFICANT CHANGE UP
CREAT SERPL-MCNC: 1.07 MG/DL — SIGNIFICANT CHANGE UP (ref 0.5–1.3)
DIFF PNL FLD: ABNORMAL
EGFR: 89 ML/MIN/1.73M2 — SIGNIFICANT CHANGE UP
EOSINOPHIL # BLD AUTO: 0.63 K/UL — HIGH (ref 0–0.5)
EOSINOPHIL NFR BLD AUTO: 13.1 % — HIGH (ref 0–6)
ETHANOL SERPL-MCNC: <10 MG/DL — SIGNIFICANT CHANGE UP (ref 0–10)
FLUAV AG NPH QL: SIGNIFICANT CHANGE UP
FLUBV AG NPH QL: SIGNIFICANT CHANGE UP
GLUCOSE SERPL-MCNC: 103 MG/DL — HIGH (ref 70–99)
GLUCOSE UR QL: NEGATIVE — SIGNIFICANT CHANGE UP
HCT VFR BLD CALC: 43.3 % — SIGNIFICANT CHANGE UP (ref 39–50)
HGB BLD-MCNC: 14.4 G/DL — SIGNIFICANT CHANGE UP (ref 13–17)
IMM GRANULOCYTES NFR BLD AUTO: 0.2 % — SIGNIFICANT CHANGE UP (ref 0–0.9)
KETONES UR-MCNC: NEGATIVE — SIGNIFICANT CHANGE UP
LEUKOCYTE ESTERASE UR-ACNC: NEGATIVE — SIGNIFICANT CHANGE UP
LYMPHOCYTES # BLD AUTO: 1.5 K/UL — SIGNIFICANT CHANGE UP (ref 1–3.3)
LYMPHOCYTES # BLD AUTO: 31.1 % — SIGNIFICANT CHANGE UP (ref 13–44)
MCHC RBC-ENTMCNC: 32.5 PG — SIGNIFICANT CHANGE UP (ref 27–34)
MCHC RBC-ENTMCNC: 33.3 GM/DL — SIGNIFICANT CHANGE UP (ref 32–36)
MCV RBC AUTO: 97.7 FL — SIGNIFICANT CHANGE UP (ref 80–100)
METHADONE UR-MCNC: NEGATIVE — SIGNIFICANT CHANGE UP
MONOCYTES # BLD AUTO: 0.6 K/UL — SIGNIFICANT CHANGE UP (ref 0–0.9)
MONOCYTES NFR BLD AUTO: 12.4 % — SIGNIFICANT CHANGE UP (ref 2–14)
NEUTROPHILS # BLD AUTO: 2.04 K/UL — SIGNIFICANT CHANGE UP (ref 1.8–7.4)
NEUTROPHILS NFR BLD AUTO: 42.4 % — LOW (ref 43–77)
NITRITE UR-MCNC: NEGATIVE — SIGNIFICANT CHANGE UP
OPIATES UR-MCNC: NEGATIVE — SIGNIFICANT CHANGE UP
PCP SPEC-MCNC: SIGNIFICANT CHANGE UP
PCP UR-MCNC: NEGATIVE — SIGNIFICANT CHANGE UP
PH UR: 7 — SIGNIFICANT CHANGE UP (ref 5–8)
PLATELET # BLD AUTO: 310 K/UL — SIGNIFICANT CHANGE UP (ref 150–400)
POTASSIUM SERPL-MCNC: 4.2 MMOL/L — SIGNIFICANT CHANGE UP (ref 3.5–5.3)
POTASSIUM SERPL-SCNC: 4.2 MMOL/L — SIGNIFICANT CHANGE UP (ref 3.5–5.3)
PROT SERPL-MCNC: 6.7 GM/DL — SIGNIFICANT CHANGE UP (ref 6–8.3)
PROT UR-MCNC: NEGATIVE — SIGNIFICANT CHANGE UP
RBC # BLD: 4.43 M/UL — SIGNIFICANT CHANGE UP (ref 4.2–5.8)
RBC # FLD: 13.9 % — SIGNIFICANT CHANGE UP (ref 10.3–14.5)
RSV RNA NPH QL NAA+NON-PROBE: SIGNIFICANT CHANGE UP
SALICYLATES SERPL-MCNC: <1.7 MG/DL — LOW (ref 2.8–20)
SARS-COV-2 RNA SPEC QL NAA+PROBE: SIGNIFICANT CHANGE UP
SODIUM SERPL-SCNC: 139 MMOL/L — SIGNIFICANT CHANGE UP (ref 135–145)
SP GR SPEC: 1.01 — SIGNIFICANT CHANGE UP (ref 1.01–1.02)
THC UR QL: POSITIVE — SIGNIFICANT CHANGE UP
UROBILINOGEN FLD QL: NEGATIVE — SIGNIFICANT CHANGE UP
WBC # BLD: 4.82 K/UL — SIGNIFICANT CHANGE UP (ref 3.8–10.5)
WBC # FLD AUTO: 4.82 K/UL — SIGNIFICANT CHANGE UP (ref 3.8–10.5)

## 2023-03-03 PROCEDURE — 36415 COLL VENOUS BLD VENIPUNCTURE: CPT

## 2023-03-03 PROCEDURE — 81001 URINALYSIS AUTO W/SCOPE: CPT

## 2023-03-03 PROCEDURE — 93010 ELECTROCARDIOGRAM REPORT: CPT

## 2023-03-03 PROCEDURE — 82306 VITAMIN D 25 HYDROXY: CPT

## 2023-03-03 PROCEDURE — 90792 PSYCH DIAG EVAL W/MED SRVCS: CPT

## 2023-03-03 PROCEDURE — 99285 EMERGENCY DEPT VISIT HI MDM: CPT

## 2023-03-03 PROCEDURE — 83036 HEMOGLOBIN GLYCOSYLATED A1C: CPT

## 2023-03-03 PROCEDURE — 80061 LIPID PANEL: CPT

## 2023-03-03 PROCEDURE — 99252 IP/OBS CONSLTJ NEW/EST SF 35: CPT

## 2023-03-03 PROCEDURE — 12345: CPT | Mod: NC

## 2023-03-03 PROCEDURE — 80307 DRUG TEST PRSMV CHEM ANLYZR: CPT

## 2023-03-03 PROCEDURE — 84443 ASSAY THYROID STIM HORMONE: CPT

## 2023-03-03 PROCEDURE — 80074 ACUTE HEPATITIS PANEL: CPT

## 2023-03-03 PROCEDURE — 82607 VITAMIN B-12: CPT

## 2023-03-03 RX ORDER — THIAMINE MONONITRATE (VIT B1) 100 MG
100 TABLET ORAL DAILY
Refills: 0 | Status: DISCONTINUED | OUTPATIENT
Start: 2023-03-03 | End: 2023-03-06

## 2023-03-03 RX ORDER — FOLIC ACID 0.8 MG
1 TABLET ORAL DAILY
Refills: 0 | Status: DISCONTINUED | OUTPATIENT
Start: 2023-03-03 | End: 2023-03-09

## 2023-03-03 RX ORDER — INFLUENZA VIRUS VACCINE 15; 15; 15; 15 UG/.5ML; UG/.5ML; UG/.5ML; UG/.5ML
0.5 SUSPENSION INTRAMUSCULAR ONCE
Refills: 0 | Status: DISCONTINUED | OUTPATIENT
Start: 2023-03-03 | End: 2023-03-09

## 2023-03-03 RX ADMIN — Medication 1 MILLIGRAM(S): at 20:30

## 2023-03-03 RX ADMIN — Medication 25 MILLIGRAM(S): at 11:21

## 2023-03-03 RX ADMIN — Medication 1 MILLIGRAM(S): at 15:44

## 2023-03-03 NOTE — BH SOCIAL WORK INITIAL PSYCHOSOCIAL EVALUATION - NSBHLIFEGOAL_PSY_ALL_CORE
Pt reports feeling good will have plan for d/c Pt. will have appt. for ongoing mental health care within 5-7 days of discharge  Pt. will have safe housing upon discharge.

## 2023-03-03 NOTE — ED ADULT NURSE NOTE - OBJECTIVE STATEMENT
Pt came into ED accompanied by friend. Pt is seeking psychiatric evaluation. States within the last 6 months his depression and anxiety has been worsening. States that "this feels like the end". Pt states that he has no plan to harm himself nor has he ever previously had a plan to harm himself. Pt states he hopes this is a " good first step" to getting help. Pt denies any suicidal ideations/thoughts of self harm. Pt denies CP/SOB, n/v/d.

## 2023-03-03 NOTE — BH SOCIAL WORK INITIAL PSYCHOSOCIAL EVALUATION - NSHIGHRISKBEHFT_PSY_ALL_CORE
EtOH abuse, 10 beers daily for the past 7 years. No Hx of complicated withdrawal or DT's. No legal Hx.  Reports depressive symptoms including persistent sad mood, hopelessness, helplessness, worthlessness, anhedonia, guilt feelings, difficulty concentrating, fatigue, decreased appetite, low motivation and difficulty falling asleep, lasting for the past 6 weeks. Pt reports he is more recently unable to function/ unwilling to preform ADLs/is not eating or showering, or going to work. Pt stated "does not care what happens with his life or future".

## 2023-03-03 NOTE — ED PROVIDER NOTE - OBJECTIVE STATEMENT
43 year old male presents to the ED requesting a psych evaluation and inpatient admisison to get a "reboot on his life." Pt reporting worsening depression and anxiety over the past few months secondary to life stress. Denies SI, HI, hallucinations, or any other complaints. 43 year old male presents to the ED requesting a psych evaluation and inpatient admission to get a "reboot on his life." Pt reporting worsening depression and anxiety over the past few months secondary to life stress. Pt is currently out of work, endorses drinking 10 drinks/day with occasional cocaine use. Pt relates that he either sleeps a lot of not at all. Currently goes to a talk therapist without any help. Pt is not on medications and has never had any psych admissions. Denies SI, HI, hallucinations, or any other complaints.

## 2023-03-03 NOTE — BH INPATIENT PSYCHIATRY ASSESSMENT NOTE - NSBHASSESSSUMMFT_PSY_ALL_CORE
Pt denies any prior psych hospitalization . Pt with alcohol and cocaine use , Now here with cc of depression and passive SI.  Pt denies any hallucination , no delusions elicited.

## 2023-03-03 NOTE — ED BEHAVIORAL HEALTH ASSESSMENT NOTE - HPI (INCLUDE ILLNESS QUALITY, SEVERITY, DURATION, TIMING, CONTEXT, MODIFYING FACTORS, ASSOCIATED SIGNS AND SYMPTOMS)
Patient is a 44 y/o male, single, no dependent's, domiciled alone, employed as a , currently on leave. PPHx of Depression, Anxiety and EtOH abuse. No SHIIP. No inpatient hospitalizations. No SA/NSSIB. No significant PMHx. No legal issues. No elicit substance use. +EtOH abuse, 10 beers daily for the past 7 years. No Hx of complicated withdrawal or DT's. No legal Hx. Patient is a 42 y/o male, single, no dependent's, domiciled alone, employed as a , currently on leave. PPHx of Depression, Anxiety and EtOH abuse. No SHIIP. No inpatient hospitalizations. No SA/NSSIB. No significant PMHx. No legal issues. No elicit substance use. +EtOH abuse, 10 beers daily for the past 7 years. No Hx of complicated withdrawal or DT's. No legal Hx. BIB friend for increasing depression and inability to function. Psychiatry consulted for depression.     Patient present's dysphoric, tearful, with sad affect. Reports depressive symptoms including persistent sad mood, hopelessness, helplessness, worthlessness, anhedonia, guilt feelings, difficulty concentrating, fatigue, decreased appetite, low motivation and difficulty falling asleep, lasting for the past 6 weeks. Patient reports he has never really been happy  in life but the past 6 weeks he has been unable to function, is not eating or showering, or going to work. He drinks daily, +10 beers a night for the past 7 years. Has been in rehab before and maintained 2 years of sobriety and reports that even at that time he was not happy. He denies suicidal ideation and homicidal ideation but expressed increased apathy and does not care what happens with his life or future.

## 2023-03-03 NOTE — ED ADULT TRIAGE NOTE - HEIGHT IN INCHES
Patient arrives with c/o non productive cough x 2-3 days. Reports intermittent light headed, night sweats. Upper chest pain with cough. A/O on arrival  Positive home COVID test today   1

## 2023-03-03 NOTE — BH PATIENT PROFILE - HOME MEDICATIONS
Augmentin 875 mg-125 mg oral tablet , 1 tab(s) orally every 12 hours for 10 days  ***course not started, prescribed on 2-27-23 by pt's ENT for his nose***  predniSONE 10 mg oral tablet , tab(s) orally once a day: taper as directed for 7 days  ***course not started, prescribed on 2-27-23 by pt's ENT for his nose***

## 2023-03-03 NOTE — ED ADULT NURSE NOTE - NS PRO PASSIVE SMOKE EXP
"Perc Trach Op Note    Date of operation: 5/11/2021    Preoperative diagnosis: Persistent ventilator dependent respiratory failure    Postoperative diagnosis: Same    Operation performed:    Surgeon: Dr. Jordan    Assistant/endoscopist: Dr. Hess    Anesthesia: local anesthesia, Intravenous analgesia, sedation and pharmacologic restraint     Indications: The patient is a 58 y.o. female with severe traumatic brain injury associated with persistent ventilator dependent respiratory failure.  Patient is expected to have a relatively long ICU course requiring ventilator support. Percutaneous tracheostomy is carried out in the SICU under  bronchoscopic guidance.    Findings: Bronchoscopic findings included significant thick secretions in the left lower lobe.  Tracheostomy in appropriate position without significant bleeding    Specimen: purulent sputum (786.4).    Procedure: Following informed consent, the patient was properly identified and optimally positioned in bed.  The patient was preoxygenated with 100% oxygen and placed on a set ventilator rate. A roll was placed between the patient's shoulders and the neck was slightly extended.  A \"time out\" was initiated. The correct patient, procedure and operative site were verified. The patient's allergy status was assessed. Procedural modifications were made as required. local anesthesia, Intravenous analgesia, sedation and pharmacologic restraint  was administered. The surgical site was prepped with chlorhexidine.     Dr. Hess performed the bronchoscopy. Please see dictation for full details. The fiberoptic bronchoscope was advanced through the indwelling endotracheal tube. The tube was withdrawn to the level of the vocal cords under direct vision. The anterior trachea was transilluminated through the end of the endotracheal tube and the surface landmarks for the tracheostomy were established. The scope was withdrawn prior to cannulation of the trachea  to prevent " damage to the bronchoscope.    The anterior trachea was cannulated percutaneously midway between the thyroid cartilage and the suprasternal notch. The needle was withdrawn from the angiocatheter and a wire was passed without resistance under direct bronchoscopic vision. A 1 cm vertical incision was made and the starter dilator was passed. The single graduated dilator was passed over the wire under direct vision. An 8mm cuffed Portex  tracheostomy tube was passed over the wire under direct visualization. The tracheostomy tube was connected to the ventilator circuit and the cuff was inflated. Satisfactory oxygenation and ventilation was observed. The tracheostomy tube was secured to the neck with interrupted sutures. A tracheostomy strap was applied.    The patient tolerated the procedure well and there were no apparent complications.     Yes...

## 2023-03-03 NOTE — BH INPATIENT PSYCHIATRY ASSESSMENT NOTE - CURRENT MEDICATION
MEDICATIONS  (STANDING):  influenza   Vaccine 0.5 milliLiter(s) IntraMuscular once  LORazepam     Tablet 1 milliGRAM(s) Oral three times a day  thiamine 100 milliGRAM(s) Oral daily    MEDICATIONS  (PRN):  LORazepam     Tablet 2 milliGRAM(s) Oral every 2 hours PRN Symptom-triggered 2 point increase in CIWA-Ar

## 2023-03-03 NOTE — BH INPATIENT PSYCHIATRY ASSESSMENT NOTE - NSBHTIMEACTIVITIESPERFORMED_PSY_A_CORE
1. interviewed the pt to assess the mental status   2. pt started on CIWA   3. reviewed old chart material   4. scheduled the PE , order the labs for vit D,b12, thiamine

## 2023-03-03 NOTE — ED ADULT TRIAGE NOTE - CHIEF COMPLAINT QUOTE
patient presents requesting psychiatric evaluation.  patient reports worsening depression and anxiety over the last few months, reports "my life is in shambles."  endorsing wants inpatient admission and states "I need a reboot on my life."  denies psych hx, not currently on meds.  sees a therapist.  denies SI/HI.

## 2023-03-03 NOTE — BH INPATIENT PSYCHIATRY ASSESSMENT NOTE - NSBHMETABOLIC_PSY_ALL_CORE_FT
BMI: BMI (kg/m2): 24.7 (03-03-23 @ 14:25)  HbA1c:   Glucose:   BP: 111/53 (03-03-23 @ 13:42) (111/53 - 119/69)  Lipid Panel:

## 2023-03-03 NOTE — BH SOCIAL WORK INITIAL PSYCHOSOCIAL EVALUATION - NSPROPTRIGHTSUPPORTPERSON_GEN_A_NUR
Pt asymptomatic on arrival. States sxs occurred around 10mins PTA that lasted \"a few minutes\". Has not eaten today. States last ETOH was yesterday 4-5 beers. Marijuana use 2-3 days ago.    declines

## 2023-03-03 NOTE — H&P ADULT - NS ATTEND AMEND GEN_ALL_CORE FT
Case discussed and reviewed in detail. Please note my plan below.     42 y/o M w/ PMH of Depression, anxiety, Etoh abuse, hemochromatosis, thyroid nodule, p/w worsening depression    *Depression / Anxiety   -Management as per psych    *Polysubstance abuse (Marijuana / Cocaine / Etoh)   -MVI / Thiamine / Folate  -CIWA monitoring   -Tobacco cessation    *Mild transaminitis  -F/u outpatient if stable     *H/o hemochromatosis / thyroid nodule  -F/u outpatient     *DVT ppx  -Encourage Ambulation

## 2023-03-03 NOTE — BH INPATIENT PSYCHIATRY ASSESSMENT NOTE - HPI (INCLUDE ILLNESS QUALITY, SEVERITY, DURATION, TIMING, CONTEXT, MODIFYING FACTORS, ASSOCIATED SIGNS AND SYMPTOMS)
Patient is a 44 y/o male, single, no dependent's, domiciled alone, employed as a , currently on leave. PPHx of Depression, Anxiety and EtOH abuse. No SHIIP. No inpatient hospitalizations. No SA/NSSIB. No significant PMHx. No legal issues. Pt claims  elicit substance use but urine tox screen positive for cocaine. . +EtOH abuse, 10 beers daily for the past 7 years. No Hx of complicated withdrawal or DT's. No legal Hx. BIB friend for increasing depression and inability to function. Psychiatry consulted for depression.     Patient present's dysphoric, tearful, with sad affect. Reports depressive symptoms including persistent sad mood, hopelessness, helplessness, worthlessness, anhedonia, guilt feelings, difficulty concentrating, fatigue, decreased appetite, low motivation and difficulty falling asleep, lasting for the past 6 weeks. Patient reports he has never really been happy  in life but the past 6 weeks he has been unable to function, is not eating or showering, or going to work. He drinks daily, +10 beers a night for the past 7 years. Has been in rehab before and maintained 2 years of sobriety and reports that even at that time he was not happy. He denies suicidal ideation and homicidal ideation but expressed increased apathy and does not care what happens with his life or future.

## 2023-03-03 NOTE — ED BEHAVIORAL HEALTH ASSESSMENT NOTE - DESCRIPTION
See HPI Vital Signs Last 24 Hrs  T(C): 36.9 (03 Mar 2023 10:09), Max: 36.9 (03 Mar 2023 10:09)  T(F): 98.4 (03 Mar 2023 10:09), Max: 98.4 (03 Mar 2023 10:09)  HR: 66 (03 Mar 2023 10:09) (66 - 66)  BP: 119/69 (03 Mar 2023 10:09) (119/69 - 119/69)  BP(mean): 84 (03 Mar 2023 10:09) (84 - 84)  RR: 18 (03 Mar 2023 10:09) (18 - 18)  SpO2: 96% (03 Mar 2023 10:09) (96% - 96%)    Parameters below as of 03 Mar 2023 10:09  Patient On (Oxygen Delivery Method): room air None

## 2023-03-03 NOTE — BH INPATIENT PSYCHIATRY ASSESSMENT NOTE - NSBHCHARTREVIEWVS_PSY_A_CORE FT
Vital Signs Last 24 Hrs  T(C): 36.7 (03-03-23 @ 14:25), Max: 37.1 (03-03-23 @ 13:42)  T(F): 98 (03-03-23 @ 14:25), Max: 98.8 (03-03-23 @ 13:42)  HR: 56 (03-03-23 @ 13:42) (56 - 66)  BP: 111/53 (03-03-23 @ 13:42) (111/53 - 119/69)  BP(mean): 68 (03-03-23 @ 13:42) (68 - 84)  RR: 16 (03-03-23 @ 14:25) (16 - 18)  SpO2: 98% (03-03-23 @ 14:25) (96% - 99%)    Orthostatic VS  03-03-23 @ 14:25  Lying BP: --/-- HR: --  Sitting BP: 139/69 HR: 63  Standing BP: 119/72 HR: 73  Site: --  Mode: electronic

## 2023-03-03 NOTE — H&P ADULT - HISTORY OF PRESENT ILLNESS
44 yo M with PMH significant for Depression, Anxiety, ETOH abuse admitted to  for Mx of increasing depression. As per Psych note, Pt is sad, tearful, c/o hopelessness,  helplessness,  guilty feeling, fatigue, low motivation. Pt denies suicidal or homicidal ideation. Pt is resting in a bed. poor historian 2/2 to psych problems. Pt denies headache, dizziness, chest pain, palpitations or SOB. Pt denies fever, chills, cough, dysuria or diarrhea. Medicine consult is called for medical Mx. Pt was seen and examined in presence of   Nursing Assistant.     PMH: As above   PSH: Pt denies any surgery in the past   Social Hx:  Pt reported that he  smokes  cigs. Pt also reported that he uses Marijuana and cocaine. Pt also reported drinking 10 beers per day.  Family Hx: Pt denies any family Hx of CAD, DM, HTN or any psych problems  44 yo M with PMH significant for Hemochromatosis, Solitary thyroid nodule , Depression, Anxiety, ETOH abuse admitted to  for Mx of increasing depression. As per Psych note, Pt is sad, tearful, c/o hopelessness,  helplessness,  guilty feeling, fatigue, low motivation. Pt denies suicidal or homicidal ideation. Pt is resting in a bed. poor historian 2/2 to psych problems. Pt denies headache, dizziness, chest pain, palpitations or SOB. Pt denies fever, chills, cough, dysuria or diarrhea. Medicine consult is called for medical Mx. Pt was seen and examined in presence of   Nursing Assistant.     PMH: As above   PSH: Pt denies any surgery in the past   Social Hx:  Pt reported that he  smokes  cigs. Pt also reported that he uses Marijuana and cocaine. Pt also reported drinking 10 beers per day.  Family Hx: Pt denies any family Hx of CAD, DM, HTN or any psych problems

## 2023-03-03 NOTE — BH TREATMENT PLAN - NSTXPLANTHERAPYSESSIONSFT_PSY_ALL_CORE
03-05-23  Type of therapy: N/A  Type of session: N/A  Level of patient participation: Resistance to participation  Duration of participation: 0  Therapy conducted by: Psych rehab  Therapy Summary: Patient declined to attend group programming although encouraged.

## 2023-03-03 NOTE — BH INPATIENT PSYCHIATRY ASSESSMENT NOTE - HOMICIDALITY / AGGRESSION (CURRENT/PAST)
Some trouble with understanding him. Will be having a speech evaluation soon.   None known in lifetime

## 2023-03-03 NOTE — BH PATIENT PROFILE - FALL HARM RISK - HARM RISK INTERVENTIONS

## 2023-03-03 NOTE — BH SOCIAL WORK INITIAL PSYCHOSOCIAL EVALUATION - OTHER PAST PSYCHIATRIC HISTORY (INCLUDE DETAILS REGARDING ONSET, COURSE OF ILLNESS, INPATIENT/OUTPATIENT TREATMENT)
PPHx of Depression, Anxiety and EtOH abuse. No SHIIP. No inpatient hospitalizations. No SA/NSSIB. No significant PMHx. No legal issues. No elicit substance use. +EtOH abuse, 10 beers daily for the past 7 years. No Hx of complicated withdrawal or DT's. No legal Hx.  Reports depressive symptoms including persistent sad mood, hopelessness, helplessness, worthlessness, anhedonia, guilt feelings, difficulty concentrating, fatigue, decreased appetite, low motivation and difficulty falling asleep, lasting for the past 6 weeks. Pt reports he is more recently unable to function/ unwilling to preform ADLs/is not eating or showering, or going to work. Pt reports he drinks daily, +10 beers a night for the past 7 years with hx of rehab w/2 years of sobriety Pt reports that even at that time he was not happy. Pt denies suicidal ideation and homicidal ideation but expressed increased apathy and does not care what happens with his life or future.

## 2023-03-03 NOTE — H&P ADULT - NSHPPHYSICALEXAM_GEN_ALL_CORE
ICU Vital Signs Last 24 Hrs  T(C): 36.7 (03 Mar 2023 20:44), Max: 37.1 (03 Mar 2023 13:42)  T(F): 98 (03 Mar 2023 20:44), Max: 98.8 (03 Mar 2023 13:42)  HR: 97 (03 Mar 2023 20:44) (56 - 97)  BP: 110/62 (03 Mar 2023 20:44) (110/62 - 119/69)  BP(mean): 68 (03 Mar 2023 13:42) (68 - 84)  RR: 17 (03 Mar 2023 20:44) (16 - 18)  SpO2: 98% (03 Mar 2023 20:44) (96% - 99%)    O2 Parameters below as of 03 Mar 2023 20:44  Patient On (Oxygen Delivery Method): room air

## 2023-03-03 NOTE — ED PROVIDER NOTE - CLINICAL SUMMARY MEDICAL DECISION MAKING FREE TEXT BOX
Pt with depression, requesting inpatient Psych. No acute SI or HI. No physical complaints. Hx of alcohol abuse, no evidence of withdraw. Will give Librium and Psych consult.

## 2023-03-03 NOTE — ED BEHAVIORAL HEALTH ASSESSMENT NOTE - SUMMARY
Patient is a 44 y/o male, single, no dependent's, domiciled alone, employed as a , currently on leave. PPHx of Depression, Anxiety and EtOH abuse. No SHIIP. No inpatient hospitalizations. No SA/NSSIB. No significant PMHx. No legal issues. No elicit substance use. +EtOH abuse, 10 beers daily for the past 7 years. No Hx of complicated withdrawal or DT's. No legal Hx. BIB friend for increasing depression and inability to function. Psychiatry consulted for depression.     Patient presents with symptoms indicative of MDD and EtOH abuse. These symptoms represent a change from baseline from which the patient cannot be reasonably expected to improve with current level of care. The patient presents with risk requiring inpatient psychiatric hospitalization for safety and stabilization.

## 2023-03-03 NOTE — H&P ADULT - ASSESSMENT
44 yo M with PMH significant for Depression, Anxiety, ETOH abuse admitted to [facility] for Mx of increasing depression.    # Depression/Anxiety/Psych problems  - Manage as per primary psych team     # Mild Transaminitis   - likely 2/2 to alcohol use  - d/w Patient to limit alcohol use, provided education     # Hx of Marijuana use  - counselled on cessation, education provided     # Hx of Cocaine use  - Counselled on cessation, education provided  - EKG: Sinus bradycardia  - Denies chest pain or any acute c/o      # EKG: Sinus Bradycardia  - HR; 56, Qt/Qtc: 472/455 ms  - Pt denies any acute cardiac c/o  - Outpt f/u with PCP for fruther eval.     # DVT Ppx  - Encourage Ambulation    IMPROVE VTE Individual Risk Assessment    RISK                                                                Points    [  ] Previous VTE                                                  3    [  ] Thrombophilia                                               2    [  ] Lower limb paralysis                                      2        (unable to hold up >15 seconds)      [  ] Current Cancer                                              2         (within 6 months)    [  ] Immobilization > 24 hrs                                1    [  ] ICU/CCU stay > 24 hours                              1    [  ] Age > 60                                                      1    IMPROVE VTE Score _____0____    IMPROVE Score 0-1: Low Risk, No VTE prophylaxis required for most patients, encourage ambulation.   IMPROVE Score 2-3: At risk, pharmacologic VTE prophylaxis is indicated for most patients (in the absence of a contraindication)  IMPROVE Score > or = 4: High Risk, pharmacologic VTE prophylaxis is indicated for most patients (in the absence of a contraindication)     Case d/w       42 yo M with PMH significant for Hemochromatosis, Solitary Thyroid nodule, Depression, Anxiety, ETOH abuse admitted to [facility] for Mx of increasing depression.    # Depression/Anxiety/Psych problems  - Manage as per primary psych team     # Mild Transaminitis   - likely 2/2 to alcohol use  - d/w Patient to limit alcohol use, provided education     # Hx of Marijuana use  - counselled on cessation, education provided     # Hx of Cocaine use  - Counselled on cessation, education provided  - EKG: Sinus bradycardia  - Denies chest pain or any acute c/o      # EKG: Sinus Bradycardia  - HR; 56, Qt/Qtc: 472/455 ms  - Pt denies any acute cardiac c/o  - Outpt f/u with PCP for further eval.     # DVT Ppx  - Encourage Ambulation    IMPROVE VTE Individual Risk Assessment    RISK                                                                Points    [  ] Previous VTE                                                  3    [  ] Thrombophilia                                               2    [  ] Lower limb paralysis                                      2        (unable to hold up >15 seconds)      [  ] Current Cancer                                              2         (within 6 months)    [  ] Immobilization > 24 hrs                                1    [  ] ICU/CCU stay > 24 hours                              1    [  ] Age > 60                                                      1    IMPROVE VTE Score _____0____    IMPROVE Score 0-1: Low Risk, No VTE prophylaxis required for most patients, encourage ambulation.   IMPROVE Score 2-3: At risk, pharmacologic VTE prophylaxis is indicated for most patients (in the absence of a contraindication)  IMPROVE Score > or = 4: High Risk, pharmacologic VTE prophylaxis is indicated for most patients (in the absence of a contraindication)     Case d/w       42 yo M with PMH significant for Hemochromatosis, Solitary Thyroid nodule, Depression, Anxiety, ETOH abuse admitted to [facility] for Mx of increasing depression.    # Depression/Anxiety/Psych problems  - Manage as per primary psych team     # Mild Transaminitis   - likely 2/2 to alcohol use  - d/w Patient to limit alcohol use, provided education     # Hx of Marijuana use  - counselled on cessation, education provided     # Hx of Cocaine use  - Counselled on cessation, education provided  - EKG: Sinus bradycardia  - Denies chest pain or any acute c/o      # EKG: Sinus Bradycardia  - HR; 56, Qt/Qtc: 472/455 ms  - Pt denies any acute cardiac c/o  - Outpt f/u with PCP for further eval.     # Hx of Solitary Thyroid Nodule  - TSH pending     # DVT Ppx  - Encourage Ambulation    IMPROVE VTE Individual Risk Assessment    RISK                                                                Points    [  ] Previous VTE                                                  3    [  ] Thrombophilia                                               2    [  ] Lower limb paralysis                                      2        (unable to hold up >15 seconds)      [  ] Current Cancer                                              2         (within 6 months)    [  ] Immobilization > 24 hrs                                1    [  ] ICU/CCU stay > 24 hours                              1    [  ] Age > 60                                                      1    IMPROVE VTE Score _____0____    IMPROVE Score 0-1: Low Risk, No VTE prophylaxis required for most patients, encourage ambulation.   IMPROVE Score 2-3: At risk, pharmacologic VTE prophylaxis is indicated for most patients (in the absence of a contraindication)  IMPROVE Score > or = 4: High Risk, pharmacologic VTE prophylaxis is indicated for most patients (in the absence of a contraindication)     Case d/w

## 2023-03-03 NOTE — BH PATIENT PROFILE - STATED REASON FOR ADMISSION
Patient has a history of cocaine and alcohol abuse which has been increasing since he lost his job.  Now he is very depressed.

## 2023-03-03 NOTE — ED BEHAVIORAL HEALTH ASSESSMENT NOTE - NSBHATTESTAPPBILLTIME_PSY_A_CORE
I attest my time as FABY is greater than 50% of the total combined time spent on qualifying patient care activities. I have reviewed and verified the documentation.

## 2023-03-03 NOTE — BH PATIENT PROFILE - FUNCTIONAL ASSESSMENT - BASIC MOBILITY 5.
SUBJECTIVE:  Nick Castaneda is a 46 y.o. female who is here for a hyperthyroidism, multinodular goiter. 1. Hypothyroidism    This started in 2001. Patient was diagnosed with MNG. The problem has been worsening. Previous thyroid studies include: TSH and free thyroxine. On levothyroxine. Current complaints: fatigue, hair loss. Worsening anxiety, insomnia  Hair loss worse. Had a lot of stress, father, brother passed away recently. 2. Multinodular goiter    In 2001 she was diagnosed with 1.5 cm cystic nodule. Repeated US - right 1.4 cm, right superior 1.7 cm, left subcm nodules. Dr. Aries Mayfield follows. History of obstructive symptoms: difficulty swallowing No, changes in voice/hoarseness No.  History of radiation to patient's neck: No  Resent iodine exposure: No  Family history includes thyroid abnormalities. Family history of thyroid cancer: No    3. Overweight  Eats moderately healthy. On Optavia   Lost 65 lbs on Optavia    4. History of Hyperthyroidism    No diarrhea. Hair loss postpartum. Calcium 10.6, ULN 10.5. Had son born 3/28/2018. Has another boy 5yo  Has Mary implant for birth control. Had short periods since 3/2018.    5. Hashimoto's thyroiditis  Has fatigue. 6. Postpartum thyroiditis  Has fatigue. 10/2021  Thyroid sono copy from Dr. Aries Mayfield note: The patient was placed in a semi-recumbent position with mild neck extension. Real time B-mode ultrasound on the neck was performed in transverse/ axial and longitudinal/ sagittal planes. Stable heterogenous MNG with cyto nondiagnostic R inf predominately cystic 1.1cm (prior1.4cm) with R sup solid 1.5cm (prior 1.7cm) and L inf solid hypo 1.1cm without suspicious features. Bilateral infrathyroid LNs benign appearing likely reactive. The patient tolerated the procedure well and without side effects. 7. IFG  Glucose 102    8. Vitamin D deficiency  Has fatigue    9.  Hyperlipidemia  Father had MI at age 46  No HTN, diabetes  No smoking      Name: Yessi Ernst   :  1981   MRN:  [id number]       [facility]  Reason:  THYROID NODULE  Dict. Staff: Mauro Arias [id number]    Verified By: Jj Shell: 09  11:51 am  Exams:  US-THYROID/NECK/HEAD      Thyroid ultrasound on 2009    History: Thyroid nodule    Comparison: None    Findings: The right lobe of the gland measures greater than 5 cm in  craniocaudal dimension. It extends beyond the confines of the  transducer. Transversely, it measures 2.4 x 2.5 cm. The left  lobe measures 4.8 x 2.4 x 1.6 cm. The thyroid demonstrates  diffusely heterogeneous echogenicity. There is coarsening of the  echotexture. Within the right lobe, there is a 1.3 x 0.7 x 0.9  cm cyst. There is also a hypoechoic nodule measuring 1.2 x 0.9 x  0.7 cm. The remainder of the gland is diffusely heterogeneous  and may contain additional small hypoechoic nodules. Impression:    Diffusely heterogeneous thyroid gland. There is a discrete cyst  and a discrete nodule seen within the right lobe as described  above.   * end of result *   Status         Past Medical History:   Diagnosis Date    Acquired hypothyroidism 2018    Allergic rhinitis     Goiter, non-toxic     Hypertension     Hyperthyroidism 2018     Patient Active Problem List    Diagnosis Date Noted    Overweight (BMI 25.0-29.9) 2022    Hair loss 2022    Class 1 obesity with body mass index (BMI) of 30.0 to 30.9 in adult 2022    COVID-19 2021    IFG (impaired fasting glucose) 2020    Postpartum thyroiditis 2018    Hashimoto's thyroiditis 2018    Acquired hypothyroidism 2018    History of hyperthyroidism 2018    Class 2 obesity with body mass index (BMI) of 38.0 to 38.9 in adult 2018    GDM (gestational diabetes mellitus), class A1 01/10/2018    Psychophysiological insomnia 2017    Allergic rhinitis 2016    Multinodular goiter 2011 Mixed hyperlipidemia 05/02/2011    Vitamin D deficiency 05/02/2011    Anxiety 05/02/2011    Elevated blood pressure reading without diagnosis of hypertension 05/02/2011     Past Surgical History:   Procedure Laterality Date    CYST REMOVAL  07/18/2018    Scalp    EYE SURGERY      WISDOM TOOTH EXTRACTION       Family History   Problem Relation Age of Onset    High Blood Pressure Mother     High Cholesterol Mother     High Blood Pressure Father     High Cholesterol Father     Heart Disease Father     High Cholesterol Brother     Heart Disease Paternal Aunt     Stroke Paternal Uncle     Heart Disease Paternal Uncle     Stroke Maternal Grandmother     Arthritis Paternal Grandmother     Heart Disease Paternal Grandfather      Social History     Socioeconomic History    Marital status:      Spouse name: None    Number of children: None    Years of education: None    Highest education level: None   Tobacco Use    Smoking status: Never    Smokeless tobacco: Never   Vaping Use    Vaping Use: Never used   Substance and Sexual Activity    Alcohol use: Not Currently     Comment: 1-2 per month on average    Drug use: No    Sexual activity: Yes     Current Outpatient Medications   Medication Sig Dispense Refill    calcium carb-cholecalciferol (CALCIUM 600/VITAMIN D3) 600-20 MG-MCG TABS Take 1 tablet by mouth daily      traZODone (DESYREL) 50 MG tablet TAKE 1 TABLET BY MOUTH EVERY DAY AT BEDTIME AS NEEDED FOR SLEEP 90 tablet 1    fluticasone (FLONASE) 50 MCG/ACT nasal spray SPRAY 1 SPRAY INTO EACH NOSTRIL EVERY DAY 16 g 1    cetirizine (ZYRTEC) 10 MG tablet Take by mouth      Flaxseed, Linseed, 1000 MG CAPS Take  by mouth. fish oil-omega-3 fatty acids 1000 MG capsule Take 1,600 mg by mouth daily       multivitamin (THERAGRAN) per tablet Take 1 tablet by mouth daily. No current facility-administered medications for this visit.      Allergies   Allergen Reactions    Prednisone      flushing     Family Status Relation Name Status    Mother  Alive    Father      Brother  Alive    Ambrose Grkaylie        one  from sudden death    PUnc      MGM      PGM  Alive    PGF      MGF  Alive        thyroid, gout    Brother         Review of Systems:  Constitutional: has fatigue, no fever, has recent weight gain, no recent weight loss, no changes in appetite  Eyes: no eye pain, no change in vision, no eye redness, no eye irritation, no double vision  Ears, nose, throat: no nasal congestion, no sore throat, no earache, no decrease in hearing, no hoarseness, no dry mouth, no sinus problems, no difficulty swallowing, has neck lumps, no dental problems, no mouth sores, no ringing in ears  Pulmonary: no shortness of breath, no wheezing, no dyspnea on exertion, no cough  Cardiovascular: no chest pain, no lower extremity edema, no orthopnea, no intermittent leg claudication, no palpitations  Gastrointestinal: no abdominal pain, no nausea, no vomiting, no diarrhea, no constipation, no dysphagia, no heartburn, no bloating  Genitourinary: no dysuria, no urinary incontinence, no urinary hesitancy, no urinary frequency, no feelings of urinary urgency, no nocturia  Musculoskeletal: no joint swelling, no joint stiffness, no joint pain, no muscle cramps, no muscle pain, no bone pain  Integument/Breast: has hair loss, no skin rashes, no skin lesions, no itching, no dry skin  Neurological: no numbness, no tingling, no weakness, no confusion, no headaches, no dizziness, no fainting, no tremors, no decrease in memory, no balance problems  Psychiatric: has anxiety, no depression, has insomnia  Hematologic/Lymphatic: no tendency for easy bleeding, no swollen lymph nodes, no tendency for easy bruising  Immunology: has seasonal allergies, no frequent infections, no frequent illnesses  Endocrine: no temperature intolerance    /73   Pulse 73   Temp 98 °F (36.7 °C)   Resp 14   Ht 5' 6\" (1.676 m)   Wt 167 lb (75.8 kg)   SpO2 96%   BMI 26.95 kg/m²    Wt Readings from Last 3 Encounters:   03/03/23 167 lb (75.8 kg)   11/04/22 177 lb (80.3 kg)   08/18/22 170 lb (77.1 kg)     Body mass index is 26.95 kg/m².     OBJECTIVE:  Constitutional: no acute distress, well appearing and well nourished  Psychiatric: oriented to person, place and time, judgement and insight and normal, recent and remote memory and intact and mood and affect are normal  Skin: skin and subcutaneous tissue is normal without mass, normal turgor  Head and Face: examination of head and face revealed no abnormalities  Eyes: no lid or conjunctival swelling, erythema or discharge, pupils are normal, equal, round, reactive to light  Ears/Nose: external inspection of ears and nose revealed no abnormalities, hearing is grossly normal  Oropharynx/Mouth/Face: lips, tongue and gums are normal with no lesions, the voice quality was normal  Neck: neck is supple and symmetric, with midline trachea and no masses, thyroid is enlarged  Lymphatics: normal cervical lymph nodes, normal supraclavicular nodes  Pulmonary: no increased work of breathing or signs of respiratory distress, lungs are clear to auscultation  Cardiovascular: normal heart rate and rhythm, normal S1 and S2, no murmurs and pedal pulses and 2+ bilaterally, No edema  Abdomen: abdomen is soft, non-tender with no masses  Musculoskeletal: normal gait and station and exam of the digits and nails are normal  Neurological: normal coordination and normal general cortical function      Lab Review:    Lab Results   Component Value Date/Time    WBC 3.5 02/27/2023 08:41 AM    HGB 14.2 02/27/2023 08:41 AM    HCT 41.8 02/27/2023 08:41 AM    MCV 90.5 02/27/2023 08:41 AM     02/27/2023 08:41 AM     Lab Results   Component Value Date/Time     02/27/2023 08:40 AM    K 4.3 02/27/2023 08:40 AM     02/27/2023 08:40 AM    CO2 26 02/27/2023 08:40 AM    BUN 12 02/27/2023 08:40 AM    CREATININE 0.8 02/27/2023 08:40 AM    GLUCOSE 86 11/02/2022 08:28 AM    GLUCOSE 83 10/22/2011 10:55 AM    CALCIUM 9.5 02/27/2023 08:40 AM    PROT 6.9 02/27/2023 08:40 AM    PROT 7.1 10/22/2011 10:55 AM    LABALBU 4.4 02/27/2023 08:40 AM    BILITOT 0.3 02/27/2023 08:40 AM    ALKPHOS 47 02/27/2023 08:40 AM    AST 17 02/27/2023 08:40 AM    ALT 14 02/27/2023 08:40 AM    LABGLOM >60 02/27/2023 08:40 AM    GFRAA >60 07/20/2022 08:36 AM    GFRAA 98 10/22/2011 10:55 AM    AGRATIO 1.8 02/27/2023 08:40 AM    GLOB 2.6 05/12/2021 09:08 AM     Lab Results   Component Value Date/Time    TSH 1.09 02/27/2023 08:40 AM    FT3 2.6 02/27/2023 08:40 AM     Lab Results   Component Value Date/Time    LABA1C 4.8 02/27/2023 08:41 AM     Lab Results   Component Value Date/Time    EAG 91.1 02/27/2023 08:41 AM     Lab Results   Component Value Date/Time    CHOL 184 02/27/2023 08:41 AM     Lab Results   Component Value Date/Time    TRIG 53 02/27/2023 08:41 AM     Lab Results   Component Value Date/Time    HDL 58 02/27/2023 08:41 AM    HDL 51 03/14/2012 12:52 PM     Lab Results   Component Value Date/Time    LDLCALC 115 02/27/2023 08:41 AM     Lab Results   Component Value Date/Time    LABVLDL 11 02/27/2023 08:41 AM     No results found for: CHOLHDLRATIO  No results found for: LABMICR, [id number]  Lab Results   Component Value Date/Time    VITD25 72.8 02/27/2023 08:40 AM        ASSESSMENT/PLAN:    1. Hypothyroidism  Anxiety and insomnia worse. Stop Levothyroxine and follow  TSH 4.63-3.42-1.54-0.91-0.90-1.31-1.4  TSH 1.6 on 0.025 mg, TSH-0.69 on 0.05 mg  Follow TSH    Has some anxiety    2. Overweight  Diet, exercise. Tried HCG diet, weight watchers, low carb. On Optavia diet, lost 70 lbs. 3. Multinodular goiter  Follow sonogram with Dr. Jace Resendiz. Stable heterogenous MNG with cyto nondiagnostic R inf predominately cystic 1.1cm (prior1.4cm) with R sup solid 1.5cm (prior 1.7cm) and L inf solid hypo 1.1cm without suspicious features.   Bilateral infrathyroid LNs benign appearing likely reactive. 4. Hashimoto's thyroiditis  Follow TSH. 5. Postpartum thyroiditis  Follow TSH. 6. History of Hyperthyroidism  - T3, Free; Future  - T4, Free; Future  - TSH without Reflex; Future    7. IFG   Hemoglobin A1c 5.0  Glucose 102-88-89  Hx of gestational diabetes. Prediabetes education    8. Vitamin D deficiency  25OHVitamin D 49.8-59.8-71.6-73.6-92.3  Decrease vit D [id number] IU daily fpr summer    9. Hyperlipidemia  - lipid panel  - Diet, exercise  -27-  Lipitor 10 mg daily    Reviewed and/or ordered clinical lab results Yes  Reviewed and/or ordered radiology tests Yes   Reviewed and/or ordered other diagnostic tests No  Discussed test results with performing physician No  Independently reviewed image, tracing, or specimen No  Made a decision to obtain old records No  Reviewed and summarized old records Yes  In 2001 she was diagnosed with 1.5 cm cystic nodule. Repeated US - right 1.4 cm, right superior 1.7 cm, left subcm nodules. TSH 7/21/2018 <0.01  TSH 7/2016 2.36  TSH 8/2018 6.12  TSH 10/14/2018 4.63  11/7/2018 TSH 3.42  Obtained history from other than patient No    Ml Newsome was counseled regarding symptoms of thyroid diagnosis, course and complications of disease if inadequately treated, side effects of medications, diagnosis, treatment options, and prognosis, risks, benefits, complications, and alternatives of treatment, labs, imaging and other studies and treatment targets and goals, thyorid nodules, hyperthyroidism causes treatment. .  She understands instructions and counseling. Total time I spent for this encounter 30 minutes      No follow-ups on file. SUBJECTIVE:  Ml Newsome is a 46 y.o. female who is here for a hyperthyroidism, multinodular goiter. 1. Hypothyroidism    This started in 2001. Patient was diagnosed with MNG. The problem has been worsening. Previous thyroid studies include: TSH and free thyroxine.   On levothyroxine. Current complaints: fatigue, hair loss. Worsening anxiety, insomnia  Hair loss worse. Had a lot of stress, father, brother passed away recently. 2. Multinodular goiter    In  she was diagnosed with 1.5 cm cystic nodule. Repeated US - right 1.4 cm, right superior 1.7 cm, left subcm nodules. Dr. Zane Wang follows. History of obstructive symptoms: difficulty swallowing No, changes in voice/hoarseness No.  History of radiation to patient's neck: No  Resent iodine exposure: No  Family history includes thyroid abnormalities. Family history of thyroid cancer: No    3. Overweight  Eats moderately healthy. On Optavia   Lost 65 lbs on Optavia    4. History of Hyperthyroidism    No diarrhea. Hair loss postpartum. Calcium 10.6, ULN 10.5. Had son born 3/28/2018. Has another boy 5yo  Has Mary implant for birth control. Had short periods since 3/2018.    5. Hashimoto's thyroiditis  Has fatigue. 6. Postpartum thyroiditis  Has fatigue. 10/2021  Thyroid sono copy from Dr. Zane Wang note: The patient was placed in a semi-recumbent position with mild neck extension. Real time B-mode ultrasound on the neck was performed in transverse/ axial and longitudinal/ sagittal planes. Stable heterogenous MNG with cyto nondiagnostic R inf predominately cystic 1.1cm (prior1.4cm) with R sup solid 1.5cm (prior 1.7cm) and L inf solid hypo 1.1cm without suspicious features. Bilateral infrathyroid LNs benign appearing likely reactive. The patient tolerated the procedure well and without side effects. 7. IFG  Glucose 102    8. Vitamin D deficiency  Has fatigue    9. Hyperlipidemia  Father had MI at age 46  No HTN, diabetes  No smoking      Name: Nurys Oconnell   :  1981   MRN:  [id number]       [facility]  Reason:  THYROID NODULE  Dict. Staff: Ivon Ruiz [id number]    Verified By: Alf Form: 09  11:51 am  Exams:  US-THYROID/NECK/HEAD      Thyroid ultrasound on 04/16/2009    History: Thyroid nodule    Comparison: None    Findings: The right lobe of the gland measures greater than 5 cm in  craniocaudal dimension. It extends beyond the confines of the  transducer. Transversely, it measures 2.4 x 2.5 cm. The left  lobe measures 4.8 x 2.4 x 1.6 cm. The thyroid demonstrates  diffusely heterogeneous echogenicity. There is coarsening of the  echotexture. Within the right lobe, there is a 1.3 x 0.7 x 0.9  cm cyst. There is also a hypoechoic nodule measuring 1.2 x 0.9 x  0.7 cm. The remainder of the gland is diffusely heterogeneous  and may contain additional small hypoechoic nodules. Impression:    Diffusely heterogeneous thyroid gland. There is a discrete cyst  and a discrete nodule seen within the right lobe as described  above.   * end of result *   Status         Past Medical History:   Diagnosis Date    Acquired hypothyroidism 8/26/2018    Allergic rhinitis     Goiter, non-toxic     Hypertension     Hyperthyroidism 8/20/2018     Patient Active Problem List    Diagnosis Date Noted    Overweight (BMI 25.0-29.9) 07/22/2022    Hair loss 04/01/2022    Class 1 obesity with body mass index (BMI) of 30.0 to 30.9 in adult 04/01/2022    COVID-19 01/11/2021    IFG (impaired fasting glucose) 01/16/2020    Postpartum thyroiditis 08/26/2018    Hashimoto's thyroiditis 08/26/2018    Acquired hypothyroidism 08/26/2018    History of hyperthyroidism 08/20/2018    Class 2 obesity with body mass index (BMI) of 38.0 to 38.9 in adult 08/20/2018    GDM (gestational diabetes mellitus), class A1 01/10/2018    Psychophysiological insomnia 02/05/2017    Allergic rhinitis 07/20/2016    Multinodular goiter 05/02/2011    Mixed hyperlipidemia 05/02/2011    Vitamin D deficiency 05/02/2011    Anxiety 05/02/2011    Elevated blood pressure reading without diagnosis of hypertension 05/02/2011     Past Surgical History:   Procedure Laterality Date    CYST REMOVAL  07/18/2018    Scalp    EYE SURGERY      WISDOM TOOTH EXTRACTION       Family History   Problem Relation Age of Onset    High Blood Pressure Mother     High Cholesterol Mother     High Blood Pressure Father     High Cholesterol Father     Heart Disease Father     High Cholesterol Brother     Heart Disease Paternal Aunt     Stroke Paternal Uncle     Heart Disease Paternal Uncle     Stroke Maternal Grandmother     Arthritis Paternal Grandmother     Heart Disease Paternal Grandfather      Social History     Socioeconomic History    Marital status:      Spouse name: None    Number of children: None    Years of education: None    Highest education level: None   Tobacco Use    Smoking status: Never    Smokeless tobacco: Never   Vaping Use    Vaping Use: Never used   Substance and Sexual Activity    Alcohol use: Not Currently     Comment: 1-2 per month on average    Drug use: No    Sexual activity: Yes     Current Outpatient Medications   Medication Sig Dispense Refill    calcium carb-cholecalciferol (CALCIUM 600/VITAMIN D3) 600-20 MG-MCG TABS Take 1 tablet by mouth daily      traZODone (DESYREL) 50 MG tablet TAKE 1 TABLET BY MOUTH EVERY DAY AT BEDTIME AS NEEDED FOR SLEEP 90 tablet 1    fluticasone (FLONASE) 50 MCG/ACT nasal spray SPRAY 1 SPRAY INTO EACH NOSTRIL EVERY DAY 16 g 1    cetirizine (ZYRTEC) 10 MG tablet Take by mouth      Flaxseed, Linseed, 1000 MG CAPS Take  by mouth. fish oil-omega-3 fatty acids 1000 MG capsule Take 1,600 mg by mouth daily       multivitamin (THERAGRAN) per tablet Take 1 tablet by mouth daily. No current facility-administered medications for this visit.      Allergies   Allergen Reactions    Prednisone      flushing     Family Status   Relation Name Status    Mother  Alive    Father      Brother  Alive    Eveline Sharpe        one  from sudden death    PUnc      MGM      [facility]  Alive    PGF      MGF  Alive        thyroid, gout    Brother         Review of Systems:  Constitutional: has fatigue, no fever, has recent weight gain, no recent weight loss, no changes in appetite  Eyes: no eye pain, no change in vision, no eye redness, no eye irritation, no double vision  Ears, nose, throat: no nasal congestion, no sore throat, no earache, no decrease in hearing, no hoarseness, no dry mouth, no sinus problems, no difficulty swallowing, has neck lumps, no dental problems, no mouth sores, no ringing in ears  Pulmonary: no shortness of breath, no wheezing, no dyspnea on exertion, no cough  Cardiovascular: no chest pain, no lower extremity edema, no orthopnea, no intermittent leg claudication, no palpitations  Gastrointestinal: no abdominal pain, no nausea, no vomiting, no diarrhea, no constipation, no dysphagia, no heartburn, no bloating  Genitourinary: no dysuria, no urinary incontinence, no urinary hesitancy, no urinary frequency, no feelings of urinary urgency, no nocturia  Musculoskeletal: no joint swelling, no joint stiffness, no joint pain, no muscle cramps, no muscle pain, no bone pain  Integument/Breast: has hair loss, no skin rashes, no skin lesions, no itching, no dry skin  Neurological: no numbness, no tingling, no weakness, no confusion, no headaches, no dizziness, no fainting, no tremors, no decrease in memory, no balance problems  Psychiatric: has anxiety, no depression, has insomnia  Hematologic/Lymphatic: no tendency for easy bleeding, no swollen lymph nodes, no tendency for easy bruising  Immunology: has seasonal allergies, no frequent infections, no frequent illnesses  Endocrine: no temperature intolerance    /73   Pulse 73   Temp 98 °F (36.7 °C)   Resp 14   Ht 5' 6\" (1.676 m)   Wt 167 lb (75.8 kg)   SpO2 96%   BMI 26.95 kg/m²    Wt Readings from Last 3 Encounters:   03/03/23 167 lb (75.8 kg)   11/04/22 177 lb (80.3 kg)   08/18/22 170 lb (77.1 kg)     Body mass index is 26.95 kg/m².     OBJECTIVE:  Constitutional: no acute distress, well appearing and well nourished  Psychiatric: oriented to person, place and time, judgement and insight and normal, recent and remote memory and intact and mood and affect are normal  Skin: skin and subcutaneous tissue is normal without mass, normal turgor  Head and Face: examination of head and face revealed no abnormalities  Eyes: no lid or conjunctival swelling, erythema or discharge, pupils are normal, equal, round, reactive to light  Ears/Nose: external inspection of ears and nose revealed no abnormalities, hearing is grossly normal  Oropharynx/Mouth/Face: lips, tongue and gums are normal with no lesions, the voice quality was normal  Neck: neck is supple and symmetric, with midline trachea and no masses, thyroid is enlarged  Lymphatics: normal cervical lymph nodes, normal supraclavicular nodes  Pulmonary: no increased work of breathing or signs of respiratory distress, lungs are clear to auscultation  Cardiovascular: normal heart rate and rhythm, normal S1 and S2, no murmurs and pedal pulses and 2+ bilaterally, No edema  Abdomen: abdomen is soft, non-tender with no masses  Musculoskeletal: normal gait and station and exam of the digits and nails are normal  Neurological: normal coordination and normal general cortical function      Lab Review:    Lab Results   Component Value Date/Time    WBC 3.5 02/27/2023 08:41 AM    HGB 14.2 02/27/2023 08:41 AM    HCT 41.8 02/27/2023 08:41 AM    MCV 90.5 02/27/2023 08:41 AM     02/27/2023 08:41 AM     Lab Results   Component Value Date/Time     02/27/2023 08:40 AM    K 4.3 02/27/2023 08:40 AM     02/27/2023 08:40 AM    CO2 26 02/27/2023 08:40 AM    BUN 12 02/27/2023 08:40 AM    CREATININE 0.8 02/27/2023 08:40 AM    GLUCOSE 86 11/02/2022 08:28 AM    GLUCOSE 83 10/22/2011 10:55 AM    CALCIUM 9.5 02/27/2023 08:40 AM    PROT 6.9 02/27/2023 08:40 AM    PROT 7.1 10/22/2011 10:55 AM    LABALBU 4.4 02/27/2023 08:40 AM    BILITOT 0.3 02/27/2023 08:40 AM    ALKPHOS 47 02/27/2023 08:40 AM    AST 17 02/27/2023 08:40 AM    ALT 14 02/27/2023 08:40 AM    LABGLOM >60 02/27/2023 08:40 AM    GFRAA >60 07/20/2022 08:36 AM    GFRAA 98 10/22/2011 10:55 AM    AGRATIO 1.8 02/27/2023 08:40 AM    GLOB 2.6 05/12/2021 09:08 AM     Lab Results   Component Value Date/Time    TSH 1.09 02/27/2023 08:40 AM    FT3 2.6 02/27/2023 08:40 AM     Lab Results   Component Value Date/Time    LABA1C 4.8 02/27/2023 08:41 AM     Lab Results   Component Value Date/Time    EAG 91.1 02/27/2023 08:41 AM     Lab Results   Component Value Date/Time    CHOL 184 02/27/2023 08:41 AM     Lab Results   Component Value Date/Time    TRIG 53 02/27/2023 08:41 AM     Lab Results   Component Value Date/Time    HDL 58 02/27/2023 08:41 AM    HDL 51 03/14/2012 12:52 PM     Lab Results   Component Value Date/Time    LDLCALC 115 02/27/2023 08:41 AM     Lab Results   Component Value Date/Time    LABVLDL 11 02/27/2023 08:41 AM     No results found for: CHOLHDLRATIO  No results found for: LABMICR, [id number]  Lab Results   Component Value Date/Time    VITD25 72.8 02/27/2023 08:40 AM        ASSESSMENT/PLAN:    1. Hypothyroidism  Anxiety and insomnia worse. Stopped Levothyroxine, feels well, TSH optimal  TSH 4.63-3.42-1.54-0.91-0.90-1.31-1.4-1.09  TSH 1.6 on 0.025 mg, TSH-0.69 on 0.05 mg  Follow TSH    Has some anxiety    2. Overweight  Diet, exercise. Tried HCG diet, weight watchers, low carb. On Optavia diet, lost 70 lbs. 3. Multinodular goiter  Follow sonogram with Dr. Aries Mayfield. Stable heterogenous MNG with cyto nondiagnostic R inf predominately cystic 1.1cm (prior1.4cm) with R sup solid 1.5cm (prior 1.7cm) and L inf solid hypo 1.1cm without suspicious features. Bilateral infrathyroid LNs benign appearing likely reactive. 4. Hashimoto's thyroiditis  Follow TSH. 5. Postpartum thyroiditis  Follow TSH. 6. History of Hyperthyroidism  - T3, Free; Future  - T4, Free; Future  - TSH without Reflex; Future    7.  IFG   Hemoglobin A1c 5.0-4.8  Glucose 605-07-44  Hx of gestational diabetes. Prediabetes education    8. Vitamin D deficiency  25OHVitamin D 49.8-59.8-71.6-73.6-92.3-72.8  Decrease vit D to 1000 IU daily for summer  -25-hydroxy vitamin D    9. Hyperlipidemia  - lipid panel  - Diet, exercise  -65--115  Lipitor 10 mg daily    Reviewed and/or ordered clinical lab results Yes  Reviewed and/or ordered radiology tests Yes   Reviewed and/or ordered other diagnostic tests No  Discussed test results with performing physician No  Independently reviewed image, tracing, or specimen No  Made a decision to obtain old records No  Reviewed and summarized old records Yes  In 2001 she was diagnosed with 1.5 cm cystic nodule. Repeated US - right 1.4 cm, right superior 1.7 cm, left subcm nodules. TSH 7/21/2018 <0.01  TSH 7/2016 2.36  TSH 8/2018 6.12  TSH 10/14/2018 4.63  11/7/2018 TSH 3.42  Obtained history from other than patient No    Cassie Sands was counseled regarding symptoms of thyroid diagnosis, course and complications of disease if inadequately treated, side effects of medications, diagnosis, treatment options, and prognosis, risks, benefits, complications, and alternatives of treatment, labs, imaging and other studies and treatment targets and goals, thyorid nodules, hyperthyroidism causes treatment. .  She understands instructions and counseling. Total time I spent for this encounter 30 minutes      No follow-ups on file.  Will follow with PCP 4 = No assist / stand by assistance

## 2023-03-03 NOTE — ED BEHAVIORAL HEALTH ASSESSMENT NOTE - RISK ASSESSMENT
LOW RISK     ACUTE RISK FACTORS: Major depressive episode    CHRONIC RISK FACTORS: EtOH abuse, dysthmia     PROTECTIVE FACTORS: No suicide attempts, no violence history, no access to guns, no global insomnia, supportive family, willingness to seek help, no suicidal ideation or homicidal ideation, hopefulness for future.

## 2023-03-03 NOTE — PHARMACOTHERAPY INTERVENTION NOTE - COMMENTS
Medication reconciliation completed.  Reviewed Medication list and confirmed med allergies with patient; confirmed with Dr. Billingsley MedHx.  Pt confirms that he went to his ENT and was prescribed Prednisone taper and Augment (both filled 2-27-23) but did not  and start meds yet.

## 2023-03-03 NOTE — ED PROVIDER NOTE - NS ED ROS FT
Gen: No fever, normal appetite  Eyes: No eye irritation or discharge  ENT: No ear pain, congestion, sore throat  Resp: No cough or trouble breathing  Cardiovascular: No chest pain or palpitation  Gastroenteric: No nausea/vomiting, diarrhea, constipation  :  No change in urine output; no dysuria  MS: No joint or muscle pain  Skin: No rashes  Neuro: No headache; no abnormal movements  Remainder negative, except as per the HPI Gen: No fever, normal appetite  Eyes: No eye irritation or discharge  ENT: No ear pain, congestion, sore throat  Resp: No cough or trouble breathing  Cardiovascular: No chest pain or palpitation  Gastroenteric: No nausea/vomiting, diarrhea, constipation  :  No change in urine output; no dysuria  MS: No joint or muscle pain  Skin: No rashes  Neuro: No headache; no abnormal movements  Psych: + depression  Remainder negative, except as per the HPI

## 2023-03-03 NOTE — BH INPATIENT PSYCHIATRY ASSESSMENT NOTE - RISK ASSESSMENT
LOW RISK , mitigating pt denies any intent    ACUTE RISK FACTORS: Major depressive episode    CHRONIC RISK FACTORS: EtOH abuse, dysthmia , cocaine     PROTECTIVE FACTORS: No suicide attempts, no violence history, no access to guns, no global insomnia, supportive family, willingness to seek help, no suicidal ideation or homicidal ideation, hopefulness for future.

## 2023-03-04 PROCEDURE — 99231 SBSQ HOSP IP/OBS SF/LOW 25: CPT

## 2023-03-04 RX ADMIN — Medication 1 MILLIGRAM(S): at 06:41

## 2023-03-04 RX ADMIN — Medication 1 MILLIGRAM(S): at 09:33

## 2023-03-04 RX ADMIN — Medication 100 MILLIGRAM(S): at 09:33

## 2023-03-04 RX ADMIN — Medication 1 TABLET(S): at 09:33

## 2023-03-04 RX ADMIN — Medication 1 MILLIGRAM(S): at 15:19

## 2023-03-04 RX ADMIN — Medication 1 MILLIGRAM(S): at 20:08

## 2023-03-04 NOTE — BH INPATIENT PSYCHIATRY PROGRESS NOTE - NSBHCHARTREVIEWINVESTIGATE_PSY_A_CORE FT
Ventricular Rate 56 BPM    Atrial Rate 56 BPM    P-R Interval 126 ms    QRS Duration 94 ms    Q-T Interval 472 ms    QTC Calculation(Bazett) 455 ms    P Axis 34 degrees    R Axis 59 degrees    T Axis 64 degrees    Diagnosis Line Sinus bradycardia  Otherwise normal ECG  No previous ECGs available  Confirmed by ERIC AGUILAR, TUAN ([id number]) on 3/3/2023 7:18:23 PM

## 2023-03-04 NOTE — BH INPATIENT PSYCHIATRY PROGRESS NOTE - NSBHCHARTREVIEWLAB_PSY_A_CORE FT
14.4   4.82  )-----------( 310      ( 03 Mar 2023 10:57 )             43.3   03-03    139  |  106  |  10  ----------------------------<  103<H>  4.2   |  30  |  1.07    Ca    8.8      03 Mar 2023 10:57    TPro  6.7  /  Alb  3.4  /  TBili  0.5  /  DBili  x   /  AST  46<H>  /  ALT  33  /  AlkPhos  59  03-03

## 2023-03-05 LAB
24R-OH-CALCIDIOL SERPL-MCNC: 7.6 NG/ML — LOW (ref 30–80)
A1C WITH ESTIMATED AVERAGE GLUCOSE RESULT: 5.5 % — SIGNIFICANT CHANGE UP (ref 4–5.6)
CHOLEST SERPL-MCNC: 229 MG/DL — HIGH
ESTIMATED AVERAGE GLUCOSE: 111 MG/DL — SIGNIFICANT CHANGE UP (ref 68–114)
HAV IGM SER-ACNC: SIGNIFICANT CHANGE UP
HBV CORE IGM SER-ACNC: SIGNIFICANT CHANGE UP
HBV SURFACE AG SER-ACNC: SIGNIFICANT CHANGE UP
HCV AB S/CO SERPL IA: 0.05 S/CO — SIGNIFICANT CHANGE UP (ref 0–0.99)
HCV AB SERPL-IMP: SIGNIFICANT CHANGE UP
HDLC SERPL-MCNC: 68 MG/DL — SIGNIFICANT CHANGE UP
LIPID PNL WITH DIRECT LDL SERPL: 137 MG/DL — HIGH
NON HDL CHOLESTEROL: 161 MG/DL — HIGH
TRIGL SERPL-MCNC: 124 MG/DL — SIGNIFICANT CHANGE UP
TSH SERPL-MCNC: 0.41 UU/ML — SIGNIFICANT CHANGE UP (ref 0.34–4.82)
VIT B12 SERPL-MCNC: 199 PG/ML — LOW (ref 232–1245)

## 2023-03-05 PROCEDURE — 99231 SBSQ HOSP IP/OBS SF/LOW 25: CPT

## 2023-03-05 RX ORDER — ESCITALOPRAM OXALATE 10 MG/1
5 TABLET, FILM COATED ORAL DAILY
Refills: 0 | Status: DISCONTINUED | OUTPATIENT
Start: 2023-03-05 | End: 2023-03-06

## 2023-03-05 RX ADMIN — Medication 1 MILLIGRAM(S): at 09:48

## 2023-03-05 RX ADMIN — Medication 100 MILLIGRAM(S): at 09:48

## 2023-03-05 RX ADMIN — Medication 1 MILLIGRAM(S): at 22:01

## 2023-03-05 RX ADMIN — Medication 1 MILLIGRAM(S): at 18:11

## 2023-03-05 RX ADMIN — Medication 1 TABLET(S): at 09:48

## 2023-03-05 NOTE — BH CONSULTATION LIAISON PROGRESS NOTE - NSBHASSESSMENTFT_PSY_ALL_CORE
This is a 43 years old white man with history of alcohol abuse and depression who was admitted for depressed moods and detox on voluntary status.  Patient is on CIWA protocol and Lexapro started for depression.    Patient is at low acute suicide risk.    Continue treatment as per treating physician.

## 2023-03-05 NOTE — BH CONSULTATION LIAISON PROGRESS NOTE - NSBHCHARTREVIEWVS_PSY_A_CORE FT
Vital Signs Last 24 Hrs  T(C): 36.6 (05 Mar 2023 07:52), Max: 36.6 (05 Mar 2023 07:52)  T(F): 97.9 (05 Mar 2023 07:52), Max: 97.9 (05 Mar 2023 07:52)  HR: --  BP: --  BP(mean): --  RR: 16 (05 Mar 2023 07:52) (16 - 16)  SpO2: 98% (05 Mar 2023 07:52) (98% - 98%)

## 2023-03-05 NOTE — BH CONSULTATION LIAISON PROGRESS NOTE - NSBHFUPINTERVALHXFT_PSY_A_CORE
There is no interval change.  The patient is spending most of the time in his room in his bed.  This morning also he was sleeping and upon awakening he is able to communicate and presents cooperative.  He makes brief eye contact.  He states that he slept well through the night.  He denies any thoughts about death or dying.  He denies active suicidal thoughts plan or intent.  Denies aggression or homicidal thinking.  Patient continues to express depressed mood.

## 2023-03-05 NOTE — BH CONSULTATION LIAISON PROGRESS NOTE - CURRENT MEDICATION
MEDICATIONS  (STANDING):  escitalopram 5 milliGRAM(s) Oral daily  folic acid 1 milliGRAM(s) Oral daily  influenza   Vaccine 0.5 milliLiter(s) IntraMuscular once  LORazepam     Tablet 1 milliGRAM(s) Oral three times a day  multivitamin 1 Tablet(s) Oral daily  thiamine 100 milliGRAM(s) Oral daily    MEDICATIONS  (PRN):  LORazepam     Tablet 2 milliGRAM(s) Oral every 2 hours PRN Symptom-triggered 2 point increase in CIWA-Ar

## 2023-03-06 PROCEDURE — 99233 SBSQ HOSP IP/OBS HIGH 50: CPT

## 2023-03-06 RX ORDER — TRAZODONE HCL 50 MG
100 TABLET ORAL AT BEDTIME
Refills: 0 | Status: DISCONTINUED | OUTPATIENT
Start: 2023-03-06 | End: 2023-03-09

## 2023-03-06 RX ORDER — CHOLECALCIFEROL (VITAMIN D3) 125 MCG
4000 CAPSULE ORAL DAILY
Refills: 0 | Status: DISCONTINUED | OUTPATIENT
Start: 2023-03-06 | End: 2023-03-09

## 2023-03-06 RX ORDER — ESCITALOPRAM OXALATE 10 MG/1
10 TABLET, FILM COATED ORAL DAILY
Refills: 0 | Status: DISCONTINUED | OUTPATIENT
Start: 2023-03-06 | End: 2023-03-08

## 2023-03-06 RX ORDER — PREGABALIN 225 MG/1
1000 CAPSULE ORAL DAILY
Refills: 0 | Status: DISCONTINUED | OUTPATIENT
Start: 2023-03-06 | End: 2023-03-09

## 2023-03-06 RX ADMIN — Medication 0.5 MILLIGRAM(S): at 18:16

## 2023-03-06 RX ADMIN — Medication 1 MILLIGRAM(S): at 09:28

## 2023-03-06 RX ADMIN — ESCITALOPRAM OXALATE 5 MILLIGRAM(S): 10 TABLET, FILM COATED ORAL at 09:28

## 2023-03-06 RX ADMIN — Medication 0.5 MILLIGRAM(S): at 22:14

## 2023-03-06 RX ADMIN — Medication 1 TABLET(S): at 09:29

## 2023-03-06 RX ADMIN — Medication 1 MILLIGRAM(S): at 07:52

## 2023-03-06 RX ADMIN — Medication 100 MILLIGRAM(S): at 09:29

## 2023-03-07 PROCEDURE — 99232 SBSQ HOSP IP/OBS MODERATE 35: CPT

## 2023-03-07 RX ADMIN — Medication 0.5 MILLIGRAM(S): at 12:10

## 2023-03-07 RX ADMIN — PREGABALIN 1000 MICROGRAM(S): 225 CAPSULE ORAL at 09:41

## 2023-03-07 RX ADMIN — Medication 1 MILLIGRAM(S): at 09:15

## 2023-03-07 RX ADMIN — Medication 0.5 MILLIGRAM(S): at 20:33

## 2023-03-07 RX ADMIN — ESCITALOPRAM OXALATE 10 MILLIGRAM(S): 10 TABLET, FILM COATED ORAL at 09:14

## 2023-03-07 RX ADMIN — Medication 0.5 MILLIGRAM(S): at 09:14

## 2023-03-07 RX ADMIN — Medication 100 MILLIGRAM(S): at 20:33

## 2023-03-07 RX ADMIN — Medication 1 TABLET(S): at 09:14

## 2023-03-07 RX ADMIN — Medication 4000 UNIT(S): at 09:14

## 2023-03-07 NOTE — BH INPATIENT PSYCHIATRY PROGRESS NOTE - NSBHANTIPSYCHOTIC_PSY_ALL_CORE
----- Message from Shaina Reza sent at 3/2/2018  2:23 PM CST -----  Contact: [phone]/self  Patient would like to be seen sooner than her next appointment for pelvic pain. Please advise.    
Called left message to call back   
Patient requesting an appointment before the next available.  Patient informed Dr Davila assistant will give her a call on Monday  Patient added to wait list and message was sent  
No

## 2023-03-08 PROCEDURE — 99233 SBSQ HOSP IP/OBS HIGH 50: CPT

## 2023-03-08 RX ORDER — ESCITALOPRAM OXALATE 10 MG/1
20 TABLET, FILM COATED ORAL DAILY
Refills: 0 | Status: DISCONTINUED | OUTPATIENT
Start: 2023-03-08 | End: 2023-03-09

## 2023-03-08 RX ADMIN — Medication 1 MILLIGRAM(S): at 09:05

## 2023-03-08 RX ADMIN — PREGABALIN 1000 MICROGRAM(S): 225 CAPSULE ORAL at 14:01

## 2023-03-08 RX ADMIN — ESCITALOPRAM OXALATE 10 MILLIGRAM(S): 10 TABLET, FILM COATED ORAL at 09:03

## 2023-03-08 RX ADMIN — Medication 1 TABLET(S): at 09:04

## 2023-03-08 RX ADMIN — Medication 0.5 MILLIGRAM(S): at 09:03

## 2023-03-08 RX ADMIN — Medication 4000 UNIT(S): at 09:05

## 2023-03-08 RX ADMIN — Medication 100 MILLIGRAM(S): at 21:38

## 2023-03-09 VITALS — RESPIRATION RATE: 18 BRPM | OXYGEN SATURATION: 100 % | TEMPERATURE: 98 F

## 2023-03-09 PROCEDURE — 99239 HOSP IP/OBS DSCHRG MGMT >30: CPT

## 2023-03-09 RX ORDER — FOLIC ACID 0.8 MG
1 TABLET ORAL
Qty: 0 | Refills: 0 | DISCHARGE
Start: 2023-03-09

## 2023-03-09 RX ORDER — CHOLECALCIFEROL (VITAMIN D3) 125 MCG
4000 CAPSULE ORAL
Qty: 0 | Refills: 0 | DISCHARGE
Start: 2023-03-09

## 2023-03-09 RX ORDER — ESCITALOPRAM OXALATE 10 MG/1
1 TABLET, FILM COATED ORAL
Qty: 0 | Refills: 0 | DISCHARGE
Start: 2023-03-09

## 2023-03-09 RX ORDER — TRAZODONE HCL 50 MG
1 TABLET ORAL
Qty: 0 | Refills: 0 | DISCHARGE
Start: 2023-03-09

## 2023-03-09 RX ADMIN — Medication 100 MILLIGRAM(S): at 02:46

## 2023-03-09 RX ADMIN — PREGABALIN 1000 MICROGRAM(S): 225 CAPSULE ORAL at 09:27

## 2023-03-09 RX ADMIN — Medication 1 TABLET(S): at 09:32

## 2023-03-09 RX ADMIN — Medication 4000 UNIT(S): at 09:32

## 2023-03-09 RX ADMIN — ESCITALOPRAM OXALATE 20 MILLIGRAM(S): 10 TABLET, FILM COATED ORAL at 09:32

## 2023-03-09 RX ADMIN — Medication 1 MILLIGRAM(S): at 09:29

## 2023-03-09 NOTE — BH DISCHARGE NOTE NURSING/SOCIAL WORK/PSYCH REHAB - NSTOBACCOSMKCESSPRO_PSY_ALL_CORE
[facility]  [address] [place] [address]  [phone]/.  [place] Smoker's Quitline   [phone] ([phone])

## 2023-03-09 NOTE — BH INPATIENT PSYCHIATRY PROGRESS NOTE - NSBHCHARTREVIEWVS_PSY_A_CORE FT
Vital Signs Last 24 Hrs  T(C): 36.4 (03-09-23 @ 07:18), Max: 36.4 (03-09-23 @ 07:18)  T(F): 97.5 (03-09-23 @ 07:18), Max: 97.5 (03-09-23 @ 07:18)  HR: --  BP: --  BP(mean): --  RR: 18 (03-09-23 @ 07:18) (18 - 18)  SpO2: 100% (03-09-23 @ 07:18) (100% - 100%)    Orthostatic VS  03-09-23 @ 07:18  Lying BP: --/-- HR: --  Sitting BP: 118/75 HR: 57  Standing BP: 123/67 HR: 75  Site: upper right arm  Mode: electronic  Orthostatic VS  03-08-23 @ 07:57  Lying BP: --/-- HR: --  Sitting BP: 123/61 HR: 56  Standing BP: 125/66 HR: 80  Site: upper right arm  Mode: electronic  
Vital Signs Last 24 Hrs  T(C): 36.5   T(F): 97.7   HR: --  BP: --  BP(mean):  RR: 16   SpO2: 98%     Orthostatic VS  03-08-23 @ 07:57  Lying BP: --/-- HR: --  Sitting BP: 123/61 HR: 56  Standing BP: 125/66 HR: 80  Site: upper right arm  Mode: electronic  Orthostatic VS  03-07-23 @ 08:06  Lying BP: --/-- HR: --  Sitting BP: 105/61 HR: 71  Standing BP: 109/64 HR: 81  Site: upper right arm  Mode: electronic  
Vital Signs Last 24 Hrs  T(C): 36.5 (03-08-23 @ 07:57), Max: 36.5 (03-08-23 @ 07:57)  T(F): 97.7 (03-08-23 @ 07:57), Max: 97.7 (03-08-23 @ 07:57)  HR: --  BP: --  BP(mean): --  RR: 16 (03-08-23 @ 07:57) (16 - 16)  SpO2: 98% (03-08-23 @ 07:57) (98% - 98%)    Orthostatic VS  03-08-23 @ 07:57  Lying BP: --/-- HR: --  Sitting BP: 123/61 HR: 56  Standing BP: 125/66 HR: 80  Site: upper right arm  Mode: electronic  Orthostatic VS  03-07-23 @ 08:06  Lying BP: --/-- HR: --  Sitting BP: 105/61 HR: 71  Standing BP: 109/64 HR: 81  Site: upper right arm  Mode: electronic  
Vital Signs Last 24 Hrs  T(C): 36.7 (03-03-23 @ 20:44), Max: 37.1 (03-03-23 @ 13:42)  T(F): 98 (03-03-23 @ 20:44), Max: 98.8 (03-03-23 @ 13:42)  HR: 97 (03-03-23 @ 20:44) (56 - 97)  BP: 110/62 (03-03-23 @ 20:44) (110/62 - 111/53)  BP(mean): 68 (03-03-23 @ 13:42) (68 - 68)  RR: 17 (03-03-23 @ 20:44) (16 - 18)  SpO2: 98% (03-03-23 @ 20:44) (98% - 99%)    Orthostatic VS  03-03-23 @ 14:25  Lying BP: --/-- HR: --  Sitting BP: 139/69 HR: 63  Standing BP: 119/72 HR: 73  Site: --  Mode: electronic  
Vital Signs Last 24 Hrs  T(C): 36.4 (03-06-23 @ 07:47), Max: 36.4 (03-06-23 @ 07:47)  T(F): 97.5 (03-06-23 @ 07:47), Max: 97.5 (03-06-23 @ 07:47)  HR: --  BP: --  BP(mean): --  RR: 16 (03-06-23 @ 07:47) (16 - 16)  SpO2: 100% (03-06-23 @ 07:47) (100% - 100%)    Orthostatic VS  03-06-23 @ 07:47  Lying BP: --/-- HR: --  Sitting BP: 120/81 HR: 70  Standing BP: 110/68 HR: 78  Site: upper right arm  Mode: electronic  Orthostatic VS  03-05-23 @ 07:52  Lying BP: --/-- HR: --  Sitting BP: 112/78 HR: 71  Standing BP: 100/62 HR: 90  Site: upper right arm  Mode: electronic

## 2023-03-09 NOTE — BH INPATIENT PSYCHIATRY PROGRESS NOTE - CURRENT MEDICATION
MEDICATIONS  (STANDING):  cholecalciferol 4000 Unit(s) Oral daily  cyanocobalamin Injectable 1000 MICROGram(s) SubCutaneous daily  escitalopram 10 milliGRAM(s) Oral daily  folic acid 1 milliGRAM(s) Oral daily  influenza   Vaccine 0.5 milliLiter(s) IntraMuscular once  LORazepam     Tablet 0.5 milliGRAM(s) Oral three times a day  multivitamin 1 Tablet(s) Oral daily    MEDICATIONS  (PRN):  traZODone 100 milliGRAM(s) Oral at bedtime PRN insomnia  
MEDICATIONS  (STANDING):  cholecalciferol 4000 Unit(s) Oral daily  cyanocobalamin Injectable 1000 MICROGram(s) SubCutaneous daily  escitalopram 20 milliGRAM(s) Oral daily  folic acid 1 milliGRAM(s) Oral daily  influenza   Vaccine 0.5 milliLiter(s) IntraMuscular once  multivitamin 1 Tablet(s) Oral daily    MEDICATIONS  (PRN):  traZODone 100 milliGRAM(s) Oral at bedtime PRN insomnia  
MEDICATIONS  (STANDING):  folic acid 1 milliGRAM(s) Oral daily  influenza   Vaccine 0.5 milliLiter(s) IntraMuscular once  LORazepam     Tablet 1 milliGRAM(s) Oral three times a day  multivitamin 1 Tablet(s) Oral daily  thiamine 100 milliGRAM(s) Oral daily    MEDICATIONS  (PRN):  LORazepam     Tablet 2 milliGRAM(s) Oral every 2 hours PRN Symptom-triggered 2 point increase in CIWA-Ar  
MEDICATIONS  (STANDING):  cholecalciferol 4000 Unit(s) Oral daily  cyanocobalamin Injectable 1000 MICROGram(s) SubCutaneous daily  escitalopram 20 milliGRAM(s) Oral daily  folic acid 1 milliGRAM(s) Oral daily  influenza   Vaccine 0.5 milliLiter(s) IntraMuscular once  multivitamin 1 Tablet(s) Oral daily    MEDICATIONS  (PRN):  traZODone 100 milliGRAM(s) Oral at bedtime PRN insomnia  
MEDICATIONS  (STANDING):  cholecalciferol 4000 Unit(s) Oral daily  cyanocobalamin Injectable 1000 MICROGram(s) SubCutaneous daily  escitalopram 20 milliGRAM(s) Oral daily  folic acid 1 milliGRAM(s) Oral daily  influenza   Vaccine 0.5 milliLiter(s) IntraMuscular once  multivitamin 1 Tablet(s) Oral daily    MEDICATIONS  (PRN):  traZODone 100 milliGRAM(s) Oral at bedtime PRN insomnia

## 2023-03-09 NOTE — BH INPATIENT PSYCHIATRY PROGRESS NOTE - NSICDXBHSECONDARYDX_PSY_ALL_CORE
ETOH abuse   F10.10  Cocaine abuse   F14.10  

## 2023-03-09 NOTE — BH INPATIENT PSYCHIATRY PROGRESS NOTE - NSTXDCOTHRGOAL_PSY_ALL_CORE
Pt. will have appt. for ongoing mental health care within 5-7 days of discharge  Pt. will have safe housing upon discharge.  Social work will follow up to insure Pts. benefits are in place  SW to explore with pt. the interest and need for substance use referral for discharge

## 2023-03-09 NOTE — BH INPATIENT PSYCHIATRY DISCHARGE NOTE - HOSPITAL COURSE
Pt is denying any anxiety or depression and is verbalizing wiling to continue with the Lexapro. Pt denies any suicidal ideation intent or plan .  He denies any side effects from medication and is willing to attend aftercare treatment and rehab for his substance use.   Pt with euthymic mood and affect is full range and mood congruent .  He denies any hallucination no delusions elicited. .

## 2023-03-09 NOTE — BH DISCHARGE NOTE NURSING/SOCIAL WORK/PSYCH REHAB - NSDPDISTO_PSY_ALL_CORE
Upon completion of rehab treatment patient is able to return to his home to live at:  [address] [place] [address], [facility]. [address] [place] [address]  [phone]/Drug and Alcohol abuse residence or inpatient setting

## 2023-03-09 NOTE — BH INPATIENT PSYCHIATRY PROGRESS NOTE - NSBHMETABOLIC_PSY_ALL_CORE_FT
BMI: BMI (kg/m2): 24.7 (03-03-23 @ 14:25)  HbA1c:   Glucose:   BP: 110/62 (03-03-23 @ 20:44) (110/62 - 119/69)  Lipid Panel: 
BMI: BMI (kg/m2): 24.7 (03-03-23 @ 14:25)  HbA1c: A1C with Estimated Average Glucose Result: 5.5 % (03-05-23 @ 08:11)    Glucose:   BP: 110/62 (03-03-23 @ 20:44) (110/62 - 110/62)  Lipid Panel: Date/Time: 03-05-23 @ 08:11  Cholesterol, Serum: 229  Direct LDL: --  HDL Cholesterol, Serum: 68  Total Cholesterol/HDL Ration Measurement: --  Triglycerides, Serum: 124  
BMI: BMI (kg/m2): 24.7 (03-03-23 @ 14:25)  HbA1c: A1C with Estimated Average Glucose Result: 5.5 % (03-05-23 @ 08:11)    Glucose:   BP: --  Lipid Panel: Date/Time: 03-05-23 @ 08:11  Cholesterol, Serum: 229  Direct LDL: --  HDL Cholesterol, Serum: 68  Total Cholesterol/HDL Ration Measurement: --  Triglycerides, Serum: 124  

## 2023-03-09 NOTE — BH INPATIENT PSYCHIATRY DISCHARGE NOTE - SUBSTANCE USE
None known Surgeon/Pathologist Verbiage (Will Incorporate Name Of Surgeon From Intro If Not Blank): operated in two distinct and integrated capacities as the surgeon and pathologist.

## 2023-03-09 NOTE — BH INPATIENT PSYCHIATRY PROGRESS NOTE - NSBHFUPINTERVALCCFT_PSY_A_CORE
Depressed
"I'm ready to go for the rehab"
"I'm feeling better"
"I 'm still depressed "
"When will I be discharged"

## 2023-03-09 NOTE — BH INPATIENT PSYCHIATRY DISCHARGE NOTE - NSBHSUICIDESTATUS_PSY_ALL_CORE
pt denies any suicidal ideation intent or plan .  Pt reported euthymic mood and affect is full and mood congruent

## 2023-03-09 NOTE — BH INPATIENT PSYCHIATRY PROGRESS NOTE - NSBHTIMEACTIVITIESPERFORMED_PSY_A_CORE
1. interviewed the pt to assess current mental status  2. reviewed medications    3. reviewed lab results   4. conferred with social work concerning aftercare planning    
1. interviewed the pt to assess current mental status  2.discharge summary      3. reviewed lab results   4. conferred with aftercare , pt to attend rehab treatment    5. conferred with nursing and social work , aftercare planning 
1. interviewed the pt to assess current mental status  2. reviewed medications and started lexapro  for depression    3. reviewed lab results with low b12 and vit D and will start supplement   4. conferred with nursing and social work for d/c of ciwa , continue with taper and started aftercare planning    5. spoke with the sister gave update of progress

## 2023-03-09 NOTE — BH INPATIENT PSYCHIATRY DISCHARGE NOTE - NSDCCPCAREPLAN_GEN_ALL_CORE_FT
PRINCIPAL DISCHARGE DIAGNOSIS  Diagnosis: Severe episode of recurrent major depressive disorder, without psychotic features  Assessment and Plan of Treatment:       SECONDARY DISCHARGE DIAGNOSES  Diagnosis: ETOH abuse  Assessment and Plan of Treatment:     Diagnosis: Cocaine abuse  Assessment and Plan of Treatment:

## 2023-03-09 NOTE — BH INPATIENT PSYCHIATRY PROGRESS NOTE - NSBHATTESTBILLING_PSY_A_CORE
Billing in another system
[id number] OBS or IP - low complexity OR 25-34 mins
Billing in another system

## 2023-03-09 NOTE — BH INPATIENT PSYCHIATRY DISCHARGE NOTE - REASON FOR ADMISSION
44 y/o male, single,domiciled alone, employed as a  PPHx of Depression, Anxiety and EtOH abuse. urine tox screen positive for cocaine. . +EtOH abuse, 10 beers daily for the past 7 years. BIB friend for increasing depression and inability to function. Reports depressive symptoms including persistent sad mood, hopelessness, helplessness, worthlessness, anhedonia, guilt feelings, difficulty concentrating, fatigue, decreased appetite, low motivation and difficulty falling asleep, lasting for the past 6 weeks. Has been in rehab before and maintained 2 years of sobriety and reports that even at that time he was not happy.

## 2023-03-09 NOTE — BH INPATIENT PSYCHIATRY PROGRESS NOTE - NSDCCRITERIA_PSY_ALL_CORE
pt with euthymic mood and denies suicidal ideation 

## 2023-03-09 NOTE — BH DISCHARGE NOTE NURSING/SOCIAL WORK/PSYCH REHAB - PATIENT PORTAL LINK FT
You can access the FollowMyHealth Patient Portal offered by [facility] by registering at the following website: [email or web address]. By joining [facility]’s FollowMyHealth portal, you will also be able to view your health information using other applications (apps) compatible with our system.

## 2023-03-09 NOTE — BH DISCHARGE NOTE NURSING/SOCIAL WORK/PSYCH REHAB - NSDCADDINFO1FT_PSY_ALL_CORE
Patient will be admitted today, 3/9/23 to [facility] at 5:00PM for continuing in residential Alcohol Use Disorder and mental health treatment.

## 2023-03-09 NOTE — BH INPATIENT PSYCHIATRY DISCHARGE NOTE - NSBHDCMEDICALFT_PSY_A_CORE
Pt claimed he was told of hypothyroid condition but he never started medication  . TSH is wnl low vit D and B12 level Vitamin D, 25-Hydroxy: 7.6: Deficiency: Less than 20 ng/mL  Insufficiency: 20-29 ng/mL  Optimum Level: 30-80 ng/mL  Possible Toxicity: Greater than 150 ng/mL  The optimal level for 25-hydroxyvitamin D is based on the 2011 Endocrine  Society Clinical Practice Guideline (The Journal of Clinical  Endocrinology and Metabolism, Volume 96, Issue 7, Pages 5967-2241,  https://doi.org/10.1210/janusz.6630-6811 )  Note: This assay quantifies the sum of both 25-hydroxyvitamin D2 and  25-hydroxyvitamin D3. Result variation may be observed with other methods  due to lack of standardization. ng/mL (03.05.23 @ 08:11)  Vitamin B12, Serum: 199 pg/mL (03.05.23 @ 08:11)A1C with Estimated Average Glucose Result: 5.5: Method: Immunoassay       Reference Range                4.0-5.6%       High risk (prediabetic)        5.7-6.4%       Diabetic, diagnostic             >=6.5%       ADA diabetic treatment goal       <7.0%  The Hemoglobin A1c testing is NGSP-certified. Reference ranges are based  upon the 2010 recommendations of  the American Diabetes Association.  Interpretation may vary for children  and adolescents. % (03.05.23 @ 08:11)

## 2023-03-09 NOTE — BH DISCHARGE NOTE NURSING/SOCIAL WORK/PSYCH REHAB - NSCDUDCCRISIS_PSY_A_CORE
.  [facility] – Crisis Care for Children, Adults and Families  [address] [place] [address]  Mobile Crisis Hotline – [phone] 830-5834/.National Suicide Prevention Lifeline 2 (983) 576-2713/.  [facility]  [phone] [phone]/.  [facility] Response Crisis Hotline  [phone] 849-6484  24 hour telephone crisis intervention and suicide prevention hotline concerned with all mental health issues/.  [facility]  [address] [place] [phone]   Hours:  Monday through Friday from 9 AM to 3 PM/Other.../988 Suicide and Crisis Lifeline

## 2023-03-09 NOTE — BH INPATIENT PSYCHIATRY PROGRESS NOTE - NSBHASSESSSUMMFT_PSY_ALL_CORE
Pt with final adjustment of the lexapro and willing to attend aftercare treatement 
pt with improved mood and affect . Affect is full range and mood congruent Pt denies any suicidal ideation intent or plan ,  He denies any side effects from medication 
Pt willing to consider going to rehab txt
Pt with depressed mood with multiple life stressors made worse with the use of alcohol and cocaine.  Pt with verbalization willing to go for rehab treatment 
Patient is 43 years old white man who was admitted for depression, anxiety, substance abuse and suicidal thinking.  No interval changes in comparison to yesterday.    Plan continue current medication    MEDICATIONS  (STANDING):  folic acid 1 milliGRAM(s) Oral daily  influenza   Vaccine 0.5 milliLiter(s) IntraMuscular once  LORazepam     Tablet 1 milliGRAM(s) Oral three times a day  multivitamin 1 Tablet(s) Oral daily  thiamine 100 milliGRAM(s) Oral daily    MEDICATIONS  (PRN):  LORazepam     Tablet 2 milliGRAM(s) Oral every 2 hours PRN Symptom-triggered 2 point increase in CIWA-Ar

## 2023-03-09 NOTE — BH INPATIENT PSYCHIATRY PROGRESS NOTE - NSBHFUPINTERVALHXFT_PSY_A_CORE
Patient was interviewed in his room, he was initially sleeping and upon awakening, cooperative.  He states that he drinks alcohol every day and that he has history of withdrawal.  He denies history of blackouts and denies ever being in detox or rehab.    He describes his mood as depressed and anxious and is willing to take treatment.  Patient denies hearing voices or seeing things.  There is no paranoid thinking.  Patient denies discomfort.
Pt with denial of any suicidal ideation intent or plan .  Pt is calm , pleasant and goal directed .  Pt verbalized willing to attend aftercare treatment including rehab for substances cocaine and alcohol. . 
Pt considering rehab txt for the cocaine and alcohol.  Pt still with cc of depression and was started on lexapro.  Also with low vit D and B12 and placed on supplements . Pt denies any intent or plan   Also spoke with the sister who is support for the pt 
Pt denies any suicidal ideation  or intent  
Pt with denial of any suicidal ideation intent or plan Pt with denial of any anxiety or depressed mood

## 2023-03-09 NOTE — BH DISCHARGE NOTE NURSING/SOCIAL WORK/PSYCH REHAB - NSDCPEEMAIL_GEN_ALL_CORE
[facility] email [email or web address]

## 2023-03-09 NOTE — BH DISCHARGE NOTE NURSING/SOCIAL WORK/PSYCH REHAB - [facility]
LVM AGAIN SENDING LETTER    Unit Name: [address] [place] [address] Unit Phone Number: [phone]

## 2023-03-09 NOTE — BH INPATIENT PSYCHIATRY PROGRESS NOTE - NSBHMSEKNOWHOW_PSY_ALL_CORE
Current Events/Vocabulary

## 2023-03-09 NOTE — BH INPATIENT PSYCHIATRY PROGRESS NOTE - PRN MEDS
MEDICATIONS  (PRN):  LORazepam     Tablet 2 milliGRAM(s) Oral every 2 hours PRN Symptom-triggered 2 point increase in CIWA-Ar  
MEDICATIONS  (PRN):  traZODone 100 milliGRAM(s) Oral at bedtime PRN insomnia  

## 2023-03-09 NOTE — BH INPATIENT PSYCHIATRY DISCHARGE NOTE - NSBHMETABOLIC_PSY_ALL_CORE_FT
BMI: BMI (kg/m2): 24.7 (03-03-23 @ 14:25)  HbA1c: A1C with Estimated Average Glucose Result: 5.5 % (03-05-23 @ 08:11)    Glucose:   BP: --  Lipid Panel: Date/Time: 03-05-23 @ 08:11  Cholesterol, Serum: 229  Direct LDL: --  HDL Cholesterol, Serum: 68  Total Cholesterol/HDL Ration Measurement: --  Triglycerides, Serum: 124

## 2023-03-09 NOTE — BH DISCHARGE NOTE NURSING/SOCIAL WORK/PSYCH REHAB - NSDCPEWEB_GEN_ALL_CORE
[facility] for Tobacco Control website --- [email or web address] website --- [email or web address]

## 2023-03-09 NOTE — BH INPATIENT PSYCHIATRY DISCHARGE NOTE - NSDCMRMEDTOKEN_GEN_ALL_CORE_FT
cholecalciferol oral tablet: 4000 unit(s) orally once a day  escitalopram 20 mg oral tablet: 1 tab(s) orally once a day  folic acid 1 mg oral tablet: 1 tab(s) orally once a day  traZODone 100 mg oral tablet: 1 tab(s) orally once a day (at bedtime), As needed, insomnia

## 2023-03-10 ENCOUNTER — NON-APPOINTMENT (OUTPATIENT)
Age: 43
End: 2023-03-10

## 2023-03-10 NOTE — CHART NOTE - NSCHARTNOTEFT_GEN_A_CORE
Patient discharged to [facility] inpatient rehab.  They will not confirm or deny if patient arrived at program due to HIPPA regulations.

## 2023-03-14 DIAGNOSIS — R45.851 SUICIDAL IDEATIONS: ICD-10-CM

## 2023-03-14 DIAGNOSIS — Z20.822 CONTACT WITH AND (SUSPECTED) EXPOSURE TO COVID-19: ICD-10-CM

## 2023-03-14 DIAGNOSIS — F41.9 ANXIETY DISORDER, UNSPECIFIED: ICD-10-CM

## 2023-03-14 DIAGNOSIS — F19.10 OTHER PSYCHOACTIVE SUBSTANCE ABUSE, UNCOMPLICATED: ICD-10-CM

## 2023-03-14 DIAGNOSIS — F14.10 COCAINE ABUSE, UNCOMPLICATED: ICD-10-CM

## 2023-03-14 DIAGNOSIS — E04.1 NONTOXIC SINGLE THYROID NODULE: ICD-10-CM

## 2023-03-14 DIAGNOSIS — Z79.2 LONG TERM (CURRENT) USE OF ANTIBIOTICS: ICD-10-CM

## 2023-03-14 DIAGNOSIS — R74.01 ELEVATION OF LEVELS OF LIVER TRANSAMINASE LEVELS: ICD-10-CM

## 2023-03-14 DIAGNOSIS — F17.290 NICOTINE DEPENDENCE, OTHER TOBACCO PRODUCT, UNCOMPLICATED: ICD-10-CM

## 2023-03-14 DIAGNOSIS — F10.10 ALCOHOL ABUSE, UNCOMPLICATED: ICD-10-CM

## 2023-03-14 DIAGNOSIS — E83.119 HEMOCHROMATOSIS, UNSPECIFIED: ICD-10-CM

## 2023-03-14 DIAGNOSIS — R00.1 BRADYCARDIA, UNSPECIFIED: ICD-10-CM

## 2023-03-14 DIAGNOSIS — F33.2 MAJOR DEPRESSIVE DISORDER, RECURRENT SEVERE WITHOUT PSYCHOTIC FEATURES: ICD-10-CM

## 2023-03-14 DIAGNOSIS — F12.10 CANNABIS ABUSE, UNCOMPLICATED: ICD-10-CM

## 2023-03-14 DIAGNOSIS — Z79.52 LONG TERM (CURRENT) USE OF SYSTEMIC STEROIDS: ICD-10-CM

## 2023-04-20 ENCOUNTER — APPOINTMENT (OUTPATIENT)
Dept: INTERNAL MEDICINE | Facility: CLINIC | Age: 43
End: 2023-04-20
Payer: COMMERCIAL

## 2023-04-20 VITALS
BODY MASS INDEX: 25.71 KG/M2 | SYSTOLIC BLOOD PRESSURE: 118 MMHG | HEART RATE: 81 BPM | WEIGHT: 194 LBS | DIASTOLIC BLOOD PRESSURE: 62 MMHG | HEIGHT: 73 IN

## 2023-04-20 DIAGNOSIS — F10.21 ALCOHOL DEPENDENCE, IN REMISSION: ICD-10-CM

## 2023-04-20 PROCEDURE — 99214 OFFICE O/P EST MOD 30 MIN: CPT | Mod: 25

## 2023-04-20 PROCEDURE — 36415 COLL VENOUS BLD VENIPUNCTURE: CPT

## 2023-04-20 RX ORDER — GABAPENTIN 600 MG/1
600 TABLET, COATED ORAL
Refills: 0 | Status: ACTIVE | COMMUNITY

## 2023-04-20 NOTE — HISTORY OF PRESENT ILLNESS
[FreeTextEntry1] : follow up of  chronic medical conditions [de-identified] : Mr. KAYDEN HILL is a 43 year male history of cigarette smoker, history of alcoholism, (sober since 03/09/23) comes to the office for follow up of  chronic medical conditions.   Patient denies fever, cough SOB. No other complaints at this time.

## 2023-04-20 NOTE — PLAN
[FreeTextEntry1] : Patient has quit smoking cigarettes, alcohol and drugs on 03/09/23\par Patient will take medication as prescribed by [facility] in [place].\par \par In regards to patient's GERD patient will follow with Gastroenterologist\par \par NATHALIE- patient will follow with pulmonologist\par \par Patient will follow with ENT\par \par Patient will follow with Neurologist\par \par \par Prior to appointment and during encounter with patient extensive medical records were reviewed including but not limited to, Hospital records records, out patient records, laboratory data and microbiology data \par In addition extensive time was also spent in reviewing diagnostic studies.\par \par Total encounter total time 30 mins\par >50% of time spent counseling/coordinating care\par \par \par Counseling included abnormal lab results, differential diagnoses, treatment options, risks and benefits, lifestyle changes, current condition, medications, and dose adjustments. \par The patient was interactive, attentive, asked questions, and verbalized understanding \par \par

## 2023-04-20 NOTE — HEALTH RISK ASSESSMENT
[No] : In the past 12 months have you used drugs other than those required for medical reasons? No [0] : 2) Feeling down, depressed, or hopeless: Not at all (0) [PHQ-2 Negative - No further assessment needed] : PHQ-2 Negative - No further assessment needed [Former] : Former [< 15 Years] : < 15 Years [de-identified] : sober since 03/09/23 [[id number]] : 0 [de-identified] : quit 03/09/23

## 2023-04-21 LAB
25(OH)D3 SERPL-MCNC: 41.6 NG/ML
ALBUMIN SERPL ELPH-MCNC: 4.6 G/DL
ALP BLD-CCNC: 43 U/L
ALT SERPL-CCNC: 24 U/L
ANION GAP SERPL CALC-SCNC: 17 MMOL/L
AST SERPL-CCNC: 20 U/L
BASOPHILS # BLD AUTO: 0.02 K/UL
BASOPHILS NFR BLD AUTO: 0.4 %
BILIRUB SERPL-MCNC: 0.2 MG/DL
BUN SERPL-MCNC: 20 MG/DL
CALCIUM SERPL-MCNC: 9.8 MG/DL
CHLORIDE SERPL-SCNC: 103 MMOL/L
CHOLEST SERPL-MCNC: 282 MG/DL
CO2 SERPL-SCNC: 22 MMOL/L
CREAT SERPL-MCNC: 1.16 MG/DL
EGFR: 81 ML/MIN/1.73M2
EOSINOPHIL # BLD AUTO: 0.33 K/UL
EOSINOPHIL NFR BLD AUTO: 6.3 %
ESTIMATED AVERAGE GLUCOSE: 108 MG/DL
FERRITIN SERPL-MCNC: 101 NG/ML
FOLATE SERPL-MCNC: 18.4 NG/ML
GLUCOSE SERPL-MCNC: 96 MG/DL
HBA1C MFR BLD HPLC: 5.4 %
HCT VFR BLD CALC: 40.3 %
HCV AB SER QL: NONREACTIVE
HCV S/CO RATIO: 0.07 S/CO
HDLC SERPL-MCNC: 75 MG/DL
HGB BLD-MCNC: 12.8 G/DL
HIV1+2 AB SPEC QL IA.RAPID: NONREACTIVE
IMM GRANULOCYTES NFR BLD AUTO: 0 %
IRON SATN MFR SERPL: 28 %
IRON SERPL-MCNC: 73 UG/DL
LDLC SERPL CALC-MCNC: 165 MG/DL
LYMPHOCYTES # BLD AUTO: 2.21 K/UL
LYMPHOCYTES NFR BLD AUTO: 42.4 %
MAGNESIUM SERPL-MCNC: 2.1 MG/DL
MAN DIFF?: NORMAL
MCHC RBC-ENTMCNC: 30.9 PG
MCHC RBC-ENTMCNC: 31.8 GM/DL
MCV RBC AUTO: 97.3 FL
MONOCYTES # BLD AUTO: 0.57 K/UL
MONOCYTES NFR BLD AUTO: 10.9 %
NEUTROPHILS # BLD AUTO: 2.08 K/UL
NEUTROPHILS NFR BLD AUTO: 40 %
NONHDLC SERPL-MCNC: 206 MG/DL
PLATELET # BLD AUTO: 237 K/UL
POTASSIUM SERPL-SCNC: 4.3 MMOL/L
PROT SERPL-MCNC: 6.6 G/DL
PSA SERPL-MCNC: 0.41 NG/ML
RBC # BLD: 4.14 M/UL
RBC # FLD: 12.7 %
SODIUM SERPL-SCNC: 142 MMOL/L
TIBC SERPL-MCNC: 260 UG/DL
TRANSFERRIN SERPL-MCNC: 197 MG/DL
TRIGL SERPL-MCNC: 208 MG/DL
TSH SERPL-ACNC: 0.51 UIU/ML
UIBC SERPL-MCNC: 187 UG/DL
VIT B12 SERPL-MCNC: 303 PG/ML
WBC # FLD AUTO: 5.21 K/UL

## 2023-05-01 ENCOUNTER — APPOINTMENT (OUTPATIENT)
Dept: SURGERY | Facility: CLINIC | Age: 43
End: 2023-05-01
Payer: COMMERCIAL

## 2023-05-01 VITALS
TEMPERATURE: 96.1 F | WEIGHT: 200 LBS | HEIGHT: 73 IN | SYSTOLIC BLOOD PRESSURE: 105 MMHG | HEART RATE: 70 BPM | BODY MASS INDEX: 26.51 KG/M2 | OXYGEN SATURATION: 96 % | DIASTOLIC BLOOD PRESSURE: 66 MMHG | RESPIRATION RATE: 16 BRPM

## 2023-05-01 DIAGNOSIS — K42.9 UMBILICAL HERNIA W/OUT OBSTRUCTION OR GANGRENE: ICD-10-CM

## 2023-05-01 DIAGNOSIS — K40.90 UNILATERAL INGUINAL HERNIA, W/OUT OBSTRUCTION OR GANGRENE, NOT SPECIFIED AS RECURRENT: ICD-10-CM

## 2023-05-01 PROCEDURE — 99204 OFFICE O/P NEW MOD 45 MIN: CPT

## 2023-05-01 NOTE — HISTORY OF PRESENT ILLNESS
[de-identified] : This patient is a pleasant 43-year-old male with history of multiple substance abuse status post recent rehabilitation, NATHALIE, chronic GERD, who now presents for evaluation of right inguinal pain and umbilical bulge.  He states that the pain in his groin has been increasing over the last few months.  He states that he has had a bulge at the bellybutton for many years but it is now recently becoming uncomfortable.  He denies episodes of incarceration or strangulation.  He denies episodes of bowel obstruction.  He reports occasional testicular pain on the right side.  Eating well and having normal bowel function.  No recent fever/chills.  No dysuria or hematuria.  No history of STIs.

## 2023-05-01 NOTE — PHYSICAL EXAM
[JVD] : no jugular venous distention  [Normal Breath Sounds] : Normal breath sounds [Normal Heart Sounds] : normal heart sounds [Normal Rate and Rhythm] : normal rate and rhythm [No HSM] : no hepatosplenomegaly [No Rash or Lesion] : No rash or lesion [Alert] : alert [Oriented to Person] : oriented to person [Oriented to Place] : oriented to place [Oriented to Time] : oriented to time [Calm] : calm [de-identified] : Well-appearing [de-identified] : YAYA [de-identified] : Soft, nondistended, nontender, palpable reducible right inguinal hernia, unable to appreciate left-sided defect.  Reducible subcentimeter umbilical defect [de-identified] : No CVA tenderness [de-identified] : Full range of motion

## 2023-05-01 NOTE — ASSESSMENT
[FreeTextEntry1] : In brief, this patient is a pleasant 43-year-old male with a symptomatic right inguinal hernia and umbilical hernia.  Recommend robotic repair with mesh of the inguinal defect and primary suture repair of the umbilical defect.

## 2023-05-23 ENCOUNTER — APPOINTMENT (OUTPATIENT)
Dept: INTERNAL MEDICINE | Facility: CLINIC | Age: 43
End: 2023-05-23
Payer: COMMERCIAL

## 2023-05-23 ENCOUNTER — APPOINTMENT (OUTPATIENT)
Dept: INTERNAL MEDICINE | Facility: CLINIC | Age: 43
End: 2023-05-23

## 2023-05-23 VITALS
DIASTOLIC BLOOD PRESSURE: 60 MMHG | WEIGHT: 200 LBS | HEIGHT: 73 IN | BODY MASS INDEX: 26.51 KG/M2 | SYSTOLIC BLOOD PRESSURE: 107 MMHG | HEART RATE: 50 BPM

## 2023-05-23 DIAGNOSIS — K21.9 GASTRO-ESOPHAGEAL REFLUX DISEASE W/OUT ESOPHAGITIS: ICD-10-CM

## 2023-05-23 PROCEDURE — 36415 COLL VENOUS BLD VENIPUNCTURE: CPT

## 2023-05-23 PROCEDURE — 99214 OFFICE O/P EST MOD 30 MIN: CPT | Mod: 25

## 2023-05-23 NOTE — HEALTH RISK ASSESSMENT
[0] : 2) Feeling down, depressed, or hopeless: Not at all (0) [PHQ-2 Negative - No further assessment needed] : PHQ-2 Negative - No further assessment needed [[id number]] : 0

## 2023-05-23 NOTE — HISTORY OF PRESENT ILLNESS
[FreeTextEntry1] : follow up of  chronic medical conditions [de-identified] : Mr. KAYDEN HILL is a 43 year male history of cigarette smoker, history of alcoholism, (sober since 03/09/23) comes to the office for follow up of  chronic medical conditions and requesting blood work for b12 levels and additional blood work for his fatigue.   Patient denies fever, cough SOB. No other complaints at this time.

## 2023-05-23 NOTE — PLAN
[FreeTextEntry1] : Fatigue- will obtain blood work and call patient with results \par \par Patient has quit smoking cigarettes, alcohol and drugs on 03/09/23\par Patient will take medication as prescribed by [facility] in [place].\par \par In regards to patient's GERD patient will follow with Gastroenterologist\par \par NATHALIE- patient will follow with pulmonologist\par \par Patient will follow with ENT\par \par Patient will follow with Neurologist\par \par \par Prior to appointment and during encounter with patient extensive medical records were reviewed including but not limited to, Hospital records records, out patient records, laboratory data and microbiology data \par In addition extensive time was also spent in reviewing diagnostic studies.\par \par Total encounter total time 30 mins\par >50% of time spent counseling/coordinating care\par \par \par Counseling included abnormal lab results, differential diagnoses, treatment options, risks and benefits, lifestyle changes, current condition, medications, and dose adjustments. \par The patient was interactive, attentive, asked questions, and verbalized understanding \par \par

## 2023-05-25 LAB
ALBUMIN SERPL ELPH-MCNC: 4.8 G/DL
ALP BLD-CCNC: 41 U/L
ALT SERPL-CCNC: 20 U/L
ANION GAP SERPL CALC-SCNC: 15 MMOL/L
AST SERPL-CCNC: 15 U/L
BASOPHILS # BLD AUTO: 0.04 K/UL
BASOPHILS NFR BLD AUTO: 1 %
BILIRUB SERPL-MCNC: 0.2 MG/DL
BUN SERPL-MCNC: 13 MG/DL
CALCIUM SERPL-MCNC: 9.4 MG/DL
CHLORIDE SERPL-SCNC: 104 MMOL/L
CO2 SERPL-SCNC: 23 MMOL/L
CREAT SERPL-MCNC: 0.94 MG/DL
EGFR: 104 ML/MIN/1.73M2
EOSINOPHIL # BLD AUTO: 0.35 K/UL
EOSINOPHIL NFR BLD AUTO: 8.4 %
FERRITIN SERPL-MCNC: 91 NG/ML
FOLATE SERPL-MCNC: 13.7 NG/ML
GLUCOSE SERPL-MCNC: 89 MG/DL
HCT VFR BLD CALC: 41.2 %
HGB BLD-MCNC: 13.4 G/DL
IMM GRANULOCYTES NFR BLD AUTO: 0 %
IRON SATN MFR SERPL: 33 %
IRON SERPL-MCNC: 85 UG/DL
LYMPHOCYTES # BLD AUTO: 2.12 K/UL
LYMPHOCYTES NFR BLD AUTO: 50.6 %
MAN DIFF?: NORMAL
MCHC RBC-ENTMCNC: 30.2 PG
MCHC RBC-ENTMCNC: 32.5 GM/DL
MCV RBC AUTO: 92.8 FL
MONOCYTES # BLD AUTO: 0.39 K/UL
MONOCYTES NFR BLD AUTO: 9.3 %
NEUTROPHILS # BLD AUTO: 1.29 K/UL
NEUTROPHILS NFR BLD AUTO: 30.7 %
PLATELET # BLD AUTO: 213 K/UL
POTASSIUM SERPL-SCNC: 4 MMOL/L
PROT SERPL-MCNC: 6.6 G/DL
RBC # BLD: 4.44 M/UL
RBC # FLD: 11.9 %
SODIUM SERPL-SCNC: 141 MMOL/L
TIBC SERPL-MCNC: 254 UG/DL
TRANSFERRIN SERPL-MCNC: 199 MG/DL
UIBC SERPL-MCNC: 169 UG/DL
VIT B12 SERPL-MCNC: 349 PG/ML
WBC # FLD AUTO: 4.19 K/UL

## 2023-06-01 ENCOUNTER — APPOINTMENT (OUTPATIENT)
Dept: PULMONOLOGY | Facility: CLINIC | Age: 43
End: 2023-06-01
Payer: COMMERCIAL

## 2023-06-01 VITALS
WEIGHT: 200 LBS | HEART RATE: 60 BPM | DIASTOLIC BLOOD PRESSURE: 66 MMHG | BODY MASS INDEX: 26.51 KG/M2 | SYSTOLIC BLOOD PRESSURE: 122 MMHG | HEIGHT: 73 IN | OXYGEN SATURATION: 95 % | RESPIRATION RATE: 16 BRPM

## 2023-06-01 PROCEDURE — 99214 OFFICE O/P EST MOD 30 MIN: CPT

## 2023-06-01 RX ORDER — ACETAMINOPHEN/DIPHENHYDRAMINE 500MG-25MG
1000 TABLET ORAL DAILY
Qty: 90 | Refills: 1 | Status: DISCONTINUED | COMMUNITY
Start: 2022-09-09 | End: 2023-06-01

## 2023-06-01 RX ORDER — ELECTROLYTES/DEXTROSE
SOLUTION, ORAL ORAL
Refills: 0 | Status: ACTIVE | COMMUNITY

## 2023-06-01 NOTE — CONSULT LETTER
[Dear  ___] : Dear  [unfilled], [Consult Letter:] : I had the pleasure of evaluating your patient, [unfilled]. [Please see my note below.] : Please see my note below. [Consult Closing:] : Thank you very much for allowing me to participate in the care of this patient.  If you have any questions, please do not hesitate to contact me. [Sincerely,] : Sincerely, [FreeTextEntry3] : Rishi Godinez MD, FCCP, D. ABSM\par Pulmonary and Sleep Medicine\par [facility] at [place]

## 2023-06-01 NOTE — DISCUSSION/SUMMARY
[FreeTextEntry1] : \par #1. PFTs performed previously were essentially normal; repeat elizabeth with next visit\par #2. The patient does not appear to require chronic BD therapy at this time\par #3. SOBOE is likely related to weight or deconditioning given normal PFTs\par #4. Diet and exercise for weight loss \par #5. CXR to evaluate SOBOE was clear on 4/23/21\par #6. Consider autoCPAP therapy to treat mild NATHALIE with an AHI of 5.2 but could wait until repeat HST to evaluate need for PAP therapy given borderline mild degree of NATHALIE noted; could consider therapy for PLMD though denies significant RLS symptoms and most PLMs were not associated with arousals on prior PSG\par #7. F/u 2 months with HST and elizabeth\par #8. Flonase nasal spray for post nasal drip as needed \par #9. Pt had both Covid vaccines\par #10. Stressed importance of continued complete smoking cessation\par #11. Reviewed risks of exposure and symptoms of Covid-19 virus, including how the virus is spread and precautions to avoid dale virus.\par \par The patient expressed understanding and agreement with the above recommendations/plan and accepts responsibility to be compliant with recommended testing, therapies, and f/u visits.\par All relevant questions and concerns were addressed.

## 2023-06-01 NOTE — REVIEW OF SYSTEMS
[Postnasal Drip] : postnasal drip [SOB on Exertion] : sob on exertion [Seasonal Allergies] : seasonal allergies [GERD] : gerd [Wheezing] : no wheezing [Negative] : Endocrine

## 2023-06-01 NOTE — HISTORY OF PRESENT ILLNESS
[Follow-Up - Routine Clinic] : a routine clinic follow-up of [Snoring] : snoring [Currently Experiencing] : The patient is currently experiencing symptoms. [None] : The patient is not currently being treated for this problem [TextBox_4] : Former smoker of 3 ppw smoker for 20 years but has quit 3/2023\par Patient c/o SOBOE but is otherwise without associated respiratory complaints. \par He c/o AM cough and feels he may have PNDS.\par Pt recently was d/c'd from substance abuse rehab in 4/2023\par Last seen nearly 2 years ago (9/2021) and now presents to re-evaluate his NATHALIE and need for PAP therapy. He reports he is sleeping much better and feels he no longer requires therapy though does report occ snoring.\par

## 2023-06-08 ENCOUNTER — OUTPATIENT (OUTPATIENT)
Dept: OUTPATIENT SERVICES | Facility: HOSPITAL | Age: 43
LOS: 1 days | End: 2023-06-08
Payer: COMMERCIAL

## 2023-06-08 DIAGNOSIS — G47.33 OBSTRUCTIVE SLEEP APNEA (ADULT) (PEDIATRIC): ICD-10-CM

## 2023-06-08 PROCEDURE — 95800 SLP STDY UNATTENDED: CPT

## 2023-08-25 ENCOUNTER — APPOINTMENT (OUTPATIENT)
Dept: PULMONOLOGY | Facility: CLINIC | Age: 43
End: 2023-08-25
Payer: COMMERCIAL

## 2023-08-25 VITALS
BODY MASS INDEX: 26.53 KG/M2 | RESPIRATION RATE: 16 BRPM | SYSTOLIC BLOOD PRESSURE: 123 MMHG | WEIGHT: 198 LBS | DIASTOLIC BLOOD PRESSURE: 65 MMHG | OXYGEN SATURATION: 97 % | HEIGHT: 72.5 IN | HEART RATE: 54 BPM

## 2023-08-25 VITALS — WEIGHT: 198 LBS | BODY MASS INDEX: 26.53 KG/M2 | HEIGHT: 72.5 IN

## 2023-08-25 PROCEDURE — 99214 OFFICE O/P EST MOD 30 MIN: CPT | Mod: 25

## 2023-08-25 PROCEDURE — 94010 BREATHING CAPACITY TEST: CPT

## 2023-08-25 RX ORDER — ESCITALOPRAM OXALATE 10 MG/1
10 TABLET, FILM COATED ORAL
Refills: 0 | Status: DISCONTINUED | COMMUNITY
End: 2023-08-25

## 2023-08-25 RX ORDER — DESVENLAFAXINE 50 MG/1
50 TABLET, EXTENDED RELEASE ORAL
Qty: 30 | Refills: 0 | Status: ACTIVE | COMMUNITY
Start: 2023-08-09

## 2023-08-25 NOTE — REVIEW OF SYSTEMS
[Postnasal Drip] : postnasal drip [SOB on Exertion] : sob on exertion [Seasonal Allergies] : seasonal allergies [GERD] : gerd [Negative] : Endocrine [Wheezing] : no wheezing

## 2023-08-25 NOTE — DISCUSSION/SUMMARY
[FreeTextEntry1] : #1. PFTs performed previously were essentially normal; repeat elizabeth was normal; repeat as needed. #2. The patient does not appear to require chronic BD therapy at this time. #3. SOBOE is likely related to weight or deconditioning given normal lung function testing. #4. Diet and exercise for weight loss  #5. CXR to evaluate SOBOE was clear on 4/23/21. #6. Resume autoCPAP therapy to treat mild NATHALIE with an AHI of now 10 #7. Replace PAP equipment as needed. #8. Flonase nasal spray for postnasal drip as needed.  #9. Pt following with neurology for PLMD/RLS. #10. Stressed importance of continued complete smoking cessation #11. Pt had both Covid vaccines. #12. F/u in 1 month with compliance.  The patient expressed understanding and agreement with the above recommendations/plan and accepts responsibility to be compliant with recommended testing, therapies, and f/u visits. All relevant questions and concerns were addressed.  Itraconazole Pregnancy And Lactation Text: This medication is Pregnancy Category C and it isn't know if it is safe during pregnancy. It is also excreted in breast milk.

## 2023-08-25 NOTE — RESULTS/DATA
[TextEntry] : PSG from 4/16/21 revealed mild NATHALIE with an AHI of 5.2.  CXR from 4/23/21 was clear - reviewed results and films with patient. Home PSG from 6/8/23 revealed mild NATHALIE with an AHI of 10 The patient's overall compliance is 47-93% with a >4hr compliance of 30-80% on autoCPAP with a median and max pressure of 4.3-5 and 7-7.5 cm of water, respectively with a residual AHI of 0.8 which is normal; pt no longer using his machine.

## 2023-08-25 NOTE — END OF VISIT
[Time Spent: ___ minutes] : I have spent [unfilled] minutes of time on the encounter. [TextEntry] : Discussed with pt at length regarding NATHALIE, soboe, smoking hx, PNDS; reviewed previous CXR and current compliance with pt.

## 2023-08-25 NOTE — CONSULT LETTER
[Dear  ___] : Dear  [unfilled], [Consult Letter:] : I had the pleasure of evaluating your patient, [unfilled]. [Please see my note below.] : Please see my note below. [Consult Closing:] : Thank you very much for allowing me to participate in the care of this patient.  If you have any questions, please do not hesitate to contact me. [Sincerely,] : Sincerely, [FreeTextEntry3] : Rishi Godinez MD, FCCP, D. ABSM\par  Pulmonary and Sleep Medicine\par  [facility] at [place]

## 2023-09-20 ENCOUNTER — APPOINTMENT (OUTPATIENT)
Dept: PULMONOLOGY | Facility: CLINIC | Age: 43
End: 2023-09-20
Payer: COMMERCIAL

## 2023-09-20 VITALS
OXYGEN SATURATION: 96 % | DIASTOLIC BLOOD PRESSURE: 74 MMHG | RESPIRATION RATE: 16 BRPM | HEIGHT: 73 IN | WEIGHT: 195 LBS | SYSTOLIC BLOOD PRESSURE: 124 MMHG | BODY MASS INDEX: 25.84 KG/M2 | HEART RATE: 52 BPM

## 2023-09-20 DIAGNOSIS — J30.9 ALLERGIC RHINITIS, UNSPECIFIED: ICD-10-CM

## 2023-09-20 PROCEDURE — 99214 OFFICE O/P EST MOD 30 MIN: CPT

## 2023-09-20 RX ORDER — DESVENLAFAXINE 25 MG/1
25 TABLET, EXTENDED RELEASE ORAL
Qty: 15 | Refills: 0 | Status: DISCONTINUED | COMMUNITY
Start: 2023-06-01 | End: 2023-09-20

## 2023-09-20 RX ORDER — ESCITALOPRAM OXALATE 5 MG/1
5 TABLET ORAL
Qty: 14 | Refills: 0 | Status: DISCONTINUED | COMMUNITY
Start: 2023-08-01 | End: 2023-09-20

## 2023-09-20 RX ORDER — ESCITALOPRAM OXALATE 20 MG/1
20 TABLET ORAL
Qty: 30 | Refills: 0 | Status: DISCONTINUED | COMMUNITY
Start: 2023-06-15 | End: 2023-09-20

## 2023-09-25 ENCOUNTER — APPOINTMENT (OUTPATIENT)
Dept: INTERNAL MEDICINE | Facility: CLINIC | Age: 43
End: 2023-09-25
Payer: COMMERCIAL

## 2023-09-25 VITALS
SYSTOLIC BLOOD PRESSURE: 110 MMHG | BODY MASS INDEX: 25.84 KG/M2 | WEIGHT: 195 LBS | TEMPERATURE: 98.1 F | HEIGHT: 73 IN | DIASTOLIC BLOOD PRESSURE: 65 MMHG

## 2023-09-25 DIAGNOSIS — N39.0 URINARY TRACT INFECTION, SITE NOT SPECIFIED: ICD-10-CM

## 2023-09-25 DIAGNOSIS — R30.0 DYSURIA: ICD-10-CM

## 2023-09-25 DIAGNOSIS — R31.9 HEMATURIA, UNSPECIFIED: ICD-10-CM

## 2023-09-25 PROCEDURE — 99214 OFFICE O/P EST MOD 30 MIN: CPT

## 2023-09-26 LAB
APPEARANCE: CLEAR
BACTERIA: NEGATIVE /HPF
BILIRUBIN URINE: NEGATIVE
BLOOD URINE: ABNORMAL
C TRACH RRNA SPEC QL NAA+PROBE: NOT DETECTED
CAST: 0 /LPF
COLOR: YELLOW
EPITHELIAL CELLS: 1 /HPF
GLUCOSE QUALITATIVE U: NEGATIVE MG/DL
KETONES URINE: NEGATIVE MG/DL
LEUKOCYTE ESTERASE URINE: ABNORMAL
MICROSCOPIC-UA: NORMAL
N GONORRHOEA RRNA SPEC QL NAA+PROBE: NOT DETECTED
NITRITE URINE: NEGATIVE
PH URINE: 8
PROTEIN URINE: NEGATIVE MG/DL
RED BLOOD CELLS URINE: 1 /HPF
SOURCE AMPLIFICATION: NORMAL
SPECIFIC GRAVITY URINE: 1.01
UROBILINOGEN URINE: 0.2 MG/DL
WHITE BLOOD CELLS URINE: 44 /HPF

## 2023-09-27 ENCOUNTER — NON-APPOINTMENT (OUTPATIENT)
Age: 43
End: 2023-09-27

## 2023-09-29 LAB — BACTERIA UR CULT: ABNORMAL

## 2023-10-02 ENCOUNTER — APPOINTMENT (OUTPATIENT)
Dept: INTERNAL MEDICINE | Facility: CLINIC | Age: 43
End: 2023-10-02
Payer: COMMERCIAL

## 2023-10-02 ENCOUNTER — LABORATORY RESULT (OUTPATIENT)
Age: 43
End: 2023-10-02

## 2023-10-02 VITALS
SYSTOLIC BLOOD PRESSURE: 108 MMHG | BODY MASS INDEX: 25.84 KG/M2 | DIASTOLIC BLOOD PRESSURE: 66 MMHG | WEIGHT: 195 LBS | HEART RATE: 60 BPM | HEIGHT: 73 IN

## 2023-10-02 DIAGNOSIS — E53.8 DEFICIENCY OF OTHER SPECIFIED B GROUP VITAMINS: ICD-10-CM

## 2023-10-02 DIAGNOSIS — E55.9 VITAMIN D DEFICIENCY, UNSPECIFIED: ICD-10-CM

## 2023-10-02 DIAGNOSIS — R79.9 ABNORMAL FINDING OF BLOOD CHEMISTRY, UNSPECIFIED: ICD-10-CM

## 2023-10-02 DIAGNOSIS — Z00.00 ENCOUNTER FOR GENERAL ADULT MEDICAL EXAMINATION W/OUT ABNORMAL FINDINGS: ICD-10-CM

## 2023-10-02 PROCEDURE — 99396 PREV VISIT EST AGE 40-64: CPT | Mod: 25

## 2023-10-02 PROCEDURE — 36415 COLL VENOUS BLD VENIPUNCTURE: CPT

## 2023-10-02 RX ORDER — CHROMIUM 200 MCG
TABLET ORAL
Refills: 0 | Status: ACTIVE | COMMUNITY

## 2023-10-02 RX ORDER — SULFAMETHOXAZOLE AND TRIMETHOPRIM 800; 160 MG/1; MG/1
800-160 TABLET ORAL TWICE DAILY
Qty: 10 | Refills: 0 | Status: DISCONTINUED | COMMUNITY
Start: 2023-09-25 | End: 2023-10-02

## 2023-10-03 LAB
25(OH)D3 SERPL-MCNC: 54.3 NG/ML
ALBUMIN SERPL ELPH-MCNC: 4.4 G/DL
ALP BLD-CCNC: 44 U/L
ALT SERPL-CCNC: 19 U/L
ANION GAP SERPL CALC-SCNC: 7 MMOL/L
AST SERPL-CCNC: 17 U/L
BASOPHILS # BLD AUTO: 0.03 K/UL
BASOPHILS NFR BLD AUTO: 0.5 %
BILIRUB SERPL-MCNC: <0.2 MG/DL
BUN SERPL-MCNC: 15 MG/DL
CALCIUM SERPL-MCNC: 9.5 MG/DL
CHLORIDE SERPL-SCNC: 104 MMOL/L
CHOLEST SERPL-MCNC: 197 MG/DL
CO2 SERPL-SCNC: 28 MMOL/L
CREAT SERPL-MCNC: 1.13 MG/DL
EGFR: 84 ML/MIN/1.73M2
EOSINOPHIL # BLD AUTO: 0.35 K/UL
EOSINOPHIL NFR BLD AUTO: 6.3 %
ESTIMATED AVERAGE GLUCOSE: 103 MG/DL
FOLATE SERPL-MCNC: >20 NG/ML
GLUCOSE SERPL-MCNC: 91 MG/DL
HBA1C MFR BLD HPLC: 5.2 %
HCT VFR BLD CALC: 38.7 %
HDLC SERPL-MCNC: 53 MG/DL
HGB BLD-MCNC: 12.6 G/DL
IMM GRANULOCYTES NFR BLD AUTO: 0.5 %
LDLC SERPL CALC-MCNC: 106 MG/DL
LYMPHOCYTES # BLD AUTO: 2.77 K/UL
LYMPHOCYTES NFR BLD AUTO: 49.7 %
MAGNESIUM SERPL-MCNC: 2 MG/DL
MAN DIFF?: NORMAL
MCHC RBC-ENTMCNC: 30.2 PG
MCHC RBC-ENTMCNC: 32.6 GM/DL
MCV RBC AUTO: 92.8 FL
MONOCYTES # BLD AUTO: 0.54 K/UL
MONOCYTES NFR BLD AUTO: 9.7 %
NEUTROPHILS # BLD AUTO: 1.85 K/UL
NEUTROPHILS NFR BLD AUTO: 33.3 %
NONHDLC SERPL-MCNC: 144 MG/DL
PLATELET # BLD AUTO: 294 K/UL
POTASSIUM SERPL-SCNC: 4.4 MMOL/L
PROT SERPL-MCNC: 6.7 G/DL
RBC # BLD: 4.17 M/UL
RBC # FLD: 13.1 %
SODIUM SERPL-SCNC: 138 MMOL/L
TRIGL SERPL-MCNC: 223 MG/DL
TSH SERPL-ACNC: 0.09 UIU/ML
VIT B12 SERPL-MCNC: 430 PG/ML
WBC # FLD AUTO: 5.57 K/UL

## 2023-11-27 ENCOUNTER — APPOINTMENT (OUTPATIENT)
Dept: CARDIOLOGY | Facility: CLINIC | Age: 43
End: 2023-11-27
Payer: COMMERCIAL

## 2023-11-27 ENCOUNTER — NON-APPOINTMENT (OUTPATIENT)
Age: 43
End: 2023-11-27

## 2023-11-27 VITALS
SYSTOLIC BLOOD PRESSURE: 110 MMHG | HEIGHT: 73 IN | WEIGHT: 206 LBS | DIASTOLIC BLOOD PRESSURE: 64 MMHG | BODY MASS INDEX: 27.3 KG/M2 | RESPIRATION RATE: 16 BRPM | HEART RATE: 51 BPM

## 2023-11-27 DIAGNOSIS — F19.11 OTHER PSYCHOACTIVE SUBSTANCE ABUSE, IN REMISSION: ICD-10-CM

## 2023-11-27 DIAGNOSIS — R53.83 OTHER FATIGUE: ICD-10-CM

## 2023-11-27 PROCEDURE — 93000 ELECTROCARDIOGRAM COMPLETE: CPT

## 2023-11-27 PROCEDURE — 99214 OFFICE O/P EST MOD 30 MIN: CPT | Mod: 25

## 2023-11-27 RX ORDER — LEVOMEFOLATE CALCIUM 15 MG
15 TABLET ORAL
Qty: 90 | Refills: 0 | Status: DISCONTINUED | COMMUNITY
Start: 2023-07-08 | End: 2023-11-27

## 2023-12-21 ENCOUNTER — APPOINTMENT (OUTPATIENT)
Dept: CARDIOLOGY | Facility: CLINIC | Age: 43
End: 2023-12-21
Payer: COMMERCIAL

## 2023-12-21 PROCEDURE — 93351 STRESS TTE COMPLETE: CPT

## 2023-12-21 RX ORDER — PERFLUTREN 6.52 MG/ML
6.52 INJECTION, SUSPENSION INTRAVENOUS
Qty: 4 | Refills: 0 | Status: COMPLETED | OUTPATIENT
Start: 2023-12-21

## 2023-12-28 ENCOUNTER — APPOINTMENT (OUTPATIENT)
Dept: CARDIOLOGY | Facility: CLINIC | Age: 43
End: 2023-12-28
Payer: COMMERCIAL

## 2023-12-28 PROCEDURE — 93306 TTE W/DOPPLER COMPLETE: CPT

## 2024-01-08 ENCOUNTER — APPOINTMENT (OUTPATIENT)
Dept: CARDIOLOGY | Facility: CLINIC | Age: 44
End: 2024-01-08
Payer: COMMERCIAL

## 2024-01-08 VITALS
OXYGEN SATURATION: 97 % | HEIGHT: 73 IN | DIASTOLIC BLOOD PRESSURE: 80 MMHG | HEART RATE: 55 BPM | WEIGHT: 206 LBS | BODY MASS INDEX: 27.3 KG/M2 | RESPIRATION RATE: 16 BRPM | SYSTOLIC BLOOD PRESSURE: 108 MMHG

## 2024-01-08 DIAGNOSIS — R01.1 CARDIAC MURMUR, UNSPECIFIED: ICD-10-CM

## 2024-01-08 DIAGNOSIS — R07.89 OTHER CHEST PAIN: ICD-10-CM

## 2024-01-08 DIAGNOSIS — E78.5 HYPERLIPIDEMIA, UNSPECIFIED: ICD-10-CM

## 2024-01-08 PROCEDURE — 99214 OFFICE O/P EST MOD 30 MIN: CPT

## 2024-01-08 RX ORDER — HYDROXYZINE HYDROCHLORIDE 25 MG/1
25 TABLET ORAL
Refills: 0 | Status: DISCONTINUED | COMMUNITY
End: 2024-01-08

## 2024-01-08 NOTE — HISTORY OF PRESENT ILLNESS
[FreeTextEntry1] : Patient is a 44yo M with h/o tobacco dependence, EtOH here for cardiac follow up. Was in rehab this past spring 2023 and remains sober since (was using cocaine and EtOH). Noting headaches and significant fatigue lately. HAs been using CPAP for his NATHALIE. . No CP/SOB. Patient denies PND/orthopnea/edema/palpitations/syncope/claudication.  Due to symptoms and risk factors underwent cardiac testing.  NO new symptoms since.   Works as . Former substance abuse and EtOH.   ROS: GI and  negative

## 2024-01-08 NOTE — ASSESSMENT
[FreeTextEntry1] :      HDL 53   (10/2023)   HDL 75   (2/2021)  HOLA 12/2023: 1. Negative for ischemia 2. Achieved 10:38min, 12 METS 3. Normal HR/BP response  ECHO 12/2023: 1. Normal LV size, systolic and diastolic function. EF 60-65% 2. Mild LAE 3. Mild MR/TR, RVSP 35mmHg  ECHO 5/2021: 1. Normal LV size and function, EF 55% 2. Normal RV/LA/RA 3. Trileaflet aortic valve, focal NCC calcification 4. Mild MR and PI  EST 5/2021: 1. Negative for ischemia 2. Normal HR/BP response 3. DTS = 13, achieved 15 METS.

## 2024-01-08 NOTE — DISCUSSION/SUMMARY
[FreeTextEntry1] : Patient is a 44yo M with h/o tobacco dependence, EtOH here for cardiac follow up  -EST negative 2021, excellent functional capacity -Echo 12/2023 with normal BiV function, mild MR not clinically significant at this time, surveillance only. -HOLA 12/2023 negative at 12 METS, good functional capacity  -Lipids better recently, still could use some improvement with diet, recheck lipids later this year with PMD -BP normal , some mild positional dizziness with low resting BP, ? related. Advised hydration -Mild pleuritic CP is non cardiac  1. CV stable, continue aggressive risk factor modification  2. Regular pulm follow up, on CPAP  3. Commended on smoking/EtoH/substance abuse cessation 4. Continue omega 3 and diet/lifstyle modifications for HLD, no statin at this time  5. Follow up 1 year

## 2024-01-23 ENCOUNTER — APPOINTMENT (OUTPATIENT)
Dept: PULMONOLOGY | Facility: CLINIC | Age: 44
End: 2024-01-23

## 2024-03-19 ENCOUNTER — APPOINTMENT (OUTPATIENT)
Dept: PULMONOLOGY | Facility: CLINIC | Age: 44
End: 2024-03-19
Payer: COMMERCIAL

## 2024-03-19 VITALS
BODY MASS INDEX: 27.17 KG/M2 | RESPIRATION RATE: 16 BRPM | WEIGHT: 205 LBS | HEIGHT: 73 IN | DIASTOLIC BLOOD PRESSURE: 63 MMHG | SYSTOLIC BLOOD PRESSURE: 109 MMHG | OXYGEN SATURATION: 98 % | HEART RATE: 50 BPM

## 2024-03-19 DIAGNOSIS — G47.33 OBSTRUCTIVE SLEEP APNEA (ADULT) (PEDIATRIC): ICD-10-CM

## 2024-03-19 DIAGNOSIS — R06.09 OTHER FORMS OF DYSPNEA: ICD-10-CM

## 2024-03-19 DIAGNOSIS — R09.82 POSTNASAL DRIP: ICD-10-CM

## 2024-03-19 DIAGNOSIS — F17.210 NICOTINE DEPENDENCE, CIGARETTES, UNCOMPLICATED: ICD-10-CM

## 2024-03-19 DIAGNOSIS — F17.200 NICOTINE DEPENDENCE, UNSPECIFIED, UNCOMPLICATED: ICD-10-CM

## 2024-03-19 DIAGNOSIS — Z87.891 PERSONAL HISTORY OF NICOTINE DEPENDENCE: ICD-10-CM

## 2024-03-19 DIAGNOSIS — E66.3 OVERWEIGHT: ICD-10-CM

## 2024-03-19 DIAGNOSIS — R06.02 SHORTNESS OF BREATH: ICD-10-CM

## 2024-03-19 PROCEDURE — 99214 OFFICE O/P EST MOD 30 MIN: CPT

## 2024-03-19 PROCEDURE — G2211 COMPLEX E/M VISIT ADD ON: CPT

## 2024-03-19 RX ORDER — FERROUS GLUCONATE 256(28)MG
325 TABLET ORAL
Refills: 0 | Status: ACTIVE | COMMUNITY

## 2024-03-19 NOTE — CONSULT LETTER
[Dear  ___] : Dear  [unfilled], [Consult Letter:] : I had the pleasure of evaluating your patient, [unfilled]. [Please see my note below.] : Please see my note below. [Consult Closing:] : Thank you very much for allowing me to participate in the care of this patient.  If you have any questions, please do not hesitate to contact me. [Sincerely,] : Sincerely, [FreeTextEntry3] : Rishi Godinez MD, FCCP, D. ABSM\par  Pulmonary and Sleep Medicine\par  [facility] at [place]

## 2024-03-19 NOTE — REASON FOR VISIT
clear bilaterally.  Pupils equal, round, and reactive to light. [Follow-Up] : a follow-up visit [Sleep Apnea] : sleep apnea [Shortness of Breath] : shortness of breath [Nicotine Dependence] : nicotine dependence

## 2024-03-19 NOTE — RESULTS/DATA
[TextEntry] : PSG from 4/16/21 revealed mild NATHALIE with an AHI of 5.2.  CXR from 4/23/21 was clear - reviewed results and films with patient. Home PSG from 6/8/23 revealed mild NATHALIE with an AHI of 10 The patient's overall compliance is now 60-80% with a >4hr compliance of 60-80% on autoCPAP with a median and max pressure of 5.4 and 8.7 cm of water, respectively with a residual AHI of 2.5-1.3 which is normal.

## 2024-03-19 NOTE — PHYSICAL EXAM
[No Acute Distress] : no acute distress [Well Nourished] : well nourished [Well Developed] : well developed [Normal Appearance] : normal appearance [Supple] : supple [Normal Rate/Rhythm] : normal rate/rhythm [Normal S1, S2] : normal s1, s2 [No Murmurs] : no murmurs [No Resp Distress] : no resp distress [No Acc Muscle Use] : no acc muscle use [Normal Rhythm and Effort] : normal rhythm and effort [No Abnormalities] : no abnormalities [Clear to Auscultation Bilaterally] : clear to auscultation bilaterally [Benign] : benign [Not Tender] : not tender [Soft] : soft [Normal Gait] : normal gait [No Clubbing] : no clubbing [No Edema] : no edema [No Focal Deficits] : no focal deficits [Oriented x3] : oriented x3

## 2024-03-19 NOTE — HISTORY OF PRESENT ILLNESS
[Follow-Up - Routine Clinic] : a routine clinic follow-up of [Snoring] : snoring [Currently Experiencing] : The patient is currently experiencing symptoms. [None] : No associated symptoms are reported [CPAP] : CPAP [Good Compliance] : good compliance with treatment [Good Tolerance] : good tolerance of treatment [Good Symptom Control] : good symptom control [TextBox_4] : Former smoker of 3 ppw smoker for 20 years but has quit 3/2023. Clean, sober since 3/2023 including cigarettes, drugs, and ETOH. Patient c/o SOBOE but is otherwise without associated respiratory complaints.  He c/o AM cough and feels he may have PNDS. Pt recently was d/c'd from substance abuse rehab in 4/2023 Last seen nearly 2 years ago (9/2021) and now presents to re-evaluate his NATHALIE and need for PAP therapy. He reports he is sleeping much better and feels he no longer requires therapy though does report occ snoring.

## 2024-03-19 NOTE — DISCUSSION/SUMMARY
[FreeTextEntry1] : #1. PFTs performed previously were essentially normal; repeat elizabeth was normal; repeat as needed. #2. The patient does not appear to require chronic BD therapy at this time. #3. SOBOE is likely related to weight or deconditioning given normal lung function testing. #4. Diet and exercise for weight loss. #5. CXR to evaluate SOBOE was clear on 4/23/21. #6. Continue autoCPAP therapy to treat mild NATHALIE with an AHI of 10. The patient is using and benefitting from CPAP therapy. Encouraged improved compliance. #7. Replace PAP equipment as needed; ordered 9/20/23. #8. Flonase nasal spray for postnasal drip as needed.  #9. Pt following with neurology for PLMD/RLS. #10. Stressed importance of continued complete smoking cessation #11. Pt had both Covid vaccines. #12. F/u in 6 months with compliance and PFTs.  The patient expressed understanding and agreement with the above recommendations/plan and accepts responsibility to be compliant with recommended testing, therapies, and f/u visits. All relevant questions and concerns were addressed.

## 2024-08-08 NOTE — BH PATIENT PROFILE - PRIMARY ROLES/RESPONSIBILITIES
How to Take Your Medication Before Surgery  Preoperative Medication Instructions   Antiplatelet or Anticoagulation Medication Instructions   - Patient is on no antiplatelet or anticoagulation medications.    Additional Medication Instructions  Take all scheduled medications on the day of surgery EXCEPT for modifications listed below:   - Diuretics: DO NOT TAKE on the day of surgery.   - Herbal medications and vitamins: DO NOT TAKE 14 days prior to surgery.       
wage earner, full time

## 2024-11-26 ENCOUNTER — APPOINTMENT (OUTPATIENT)
Dept: PULMONOLOGY | Facility: CLINIC | Age: 44
End: 2024-11-26

## 2024-12-19 ENCOUNTER — NON-APPOINTMENT (OUTPATIENT)
Age: 44
End: 2024-12-19

## 2024-12-19 ENCOUNTER — APPOINTMENT (OUTPATIENT)
Dept: INTERNAL MEDICINE | Facility: CLINIC | Age: 44
End: 2024-12-19
Payer: COMMERCIAL

## 2024-12-19 VITALS
OXYGEN SATURATION: 98 % | HEIGHT: 73 IN | DIASTOLIC BLOOD PRESSURE: 70 MMHG | WEIGHT: 210 LBS | HEART RATE: 62 BPM | BODY MASS INDEX: 27.83 KG/M2 | SYSTOLIC BLOOD PRESSURE: 118 MMHG

## 2024-12-19 DIAGNOSIS — K40.90 UNILATERAL INGUINAL HERNIA, W/OUT OBSTRUCTION OR GANGRENE, NOT SPECIFIED AS RECURRENT: ICD-10-CM

## 2024-12-19 DIAGNOSIS — Z01.818 ENCOUNTER FOR OTHER PREPROCEDURAL EXAMINATION: ICD-10-CM

## 2024-12-19 PROCEDURE — G2211 COMPLEX E/M VISIT ADD ON: CPT | Mod: NC

## 2024-12-19 PROCEDURE — 99214 OFFICE O/P EST MOD 30 MIN: CPT

## 2024-12-19 PROCEDURE — 93000 ELECTROCARDIOGRAM COMPLETE: CPT

## 2024-12-19 RX ORDER — TAMSULOSIN HYDROCHLORIDE 0.4 MG/1
0.4 CAPSULE ORAL
Refills: 0 | Status: ACTIVE | COMMUNITY

## 2024-12-24 PROBLEM — F10.90 ALCOHOL USE: Status: ACTIVE | Noted: 2021-03-29

## 2025-02-13 ENCOUNTER — APPOINTMENT (OUTPATIENT)
Dept: INTERNAL MEDICINE | Facility: CLINIC | Age: 45
End: 2025-02-13
Payer: COMMERCIAL

## 2025-02-13 VITALS
BODY MASS INDEX: 26.51 KG/M2 | WEIGHT: 200 LBS | HEIGHT: 73 IN | DIASTOLIC BLOOD PRESSURE: 80 MMHG | SYSTOLIC BLOOD PRESSURE: 112 MMHG

## 2025-02-13 DIAGNOSIS — F10.21 ALCOHOL DEPENDENCE, IN REMISSION: ICD-10-CM

## 2025-02-13 DIAGNOSIS — R31.9 HEMATURIA, UNSPECIFIED: ICD-10-CM

## 2025-02-13 DIAGNOSIS — Z00.00 ENCOUNTER FOR GENERAL ADULT MEDICAL EXAMINATION W/OUT ABNORMAL FINDINGS: ICD-10-CM

## 2025-02-13 DIAGNOSIS — E78.5 HYPERLIPIDEMIA, UNSPECIFIED: ICD-10-CM

## 2025-02-13 DIAGNOSIS — R79.9 ABNORMAL FINDING OF BLOOD CHEMISTRY, UNSPECIFIED: ICD-10-CM

## 2025-02-13 DIAGNOSIS — R53.83 OTHER FATIGUE: ICD-10-CM

## 2025-02-13 DIAGNOSIS — G47.33 OBSTRUCTIVE SLEEP APNEA (ADULT) (PEDIATRIC): ICD-10-CM

## 2025-02-13 DIAGNOSIS — N40.0 BENIGN PROSTATIC HYPERPLASIA WITHOUT LOWER URINARY TRACT SYMPMS: ICD-10-CM

## 2025-02-13 PROCEDURE — 99396 PREV VISIT EST AGE 40-64: CPT

## 2025-07-30 ENCOUNTER — APPOINTMENT (OUTPATIENT)
Dept: INTERNAL MEDICINE | Facility: CLINIC | Age: 45
End: 2025-07-30
Payer: COMMERCIAL

## 2025-07-30 VITALS
BODY MASS INDEX: 26.51 KG/M2 | HEART RATE: 69 BPM | SYSTOLIC BLOOD PRESSURE: 117 MMHG | WEIGHT: 200 LBS | HEIGHT: 73 IN | DIASTOLIC BLOOD PRESSURE: 71 MMHG

## 2025-07-30 DIAGNOSIS — F10.21 ALCOHOL DEPENDENCE, IN REMISSION: ICD-10-CM

## 2025-07-30 DIAGNOSIS — R53.83 OTHER FATIGUE: ICD-10-CM

## 2025-07-30 DIAGNOSIS — E78.5 HYPERLIPIDEMIA, UNSPECIFIED: ICD-10-CM

## 2025-07-30 DIAGNOSIS — E53.8 DEFICIENCY OF OTHER SPECIFIED B GROUP VITAMINS: ICD-10-CM

## 2025-07-30 DIAGNOSIS — R79.9 ABNORMAL FINDING OF BLOOD CHEMISTRY, UNSPECIFIED: ICD-10-CM

## 2025-07-30 DIAGNOSIS — F19.11 OTHER PSYCHOACTIVE SUBSTANCE ABUSE, IN REMISSION: ICD-10-CM

## 2025-07-30 DIAGNOSIS — G47.11 IDIOPATHIC HYPERSOMNIA WITH LONG SLEEP TIME: ICD-10-CM

## 2025-07-30 DIAGNOSIS — E55.9 VITAMIN D DEFICIENCY, UNSPECIFIED: ICD-10-CM

## 2025-07-30 DIAGNOSIS — N40.0 BENIGN PROSTATIC HYPERPLASIA WITHOUT LOWER URINARY TRACT SYMPMS: ICD-10-CM

## 2025-07-30 PROCEDURE — 36415 COLL VENOUS BLD VENIPUNCTURE: CPT

## 2025-07-30 PROCEDURE — G2211 COMPLEX E/M VISIT ADD ON: CPT | Mod: NC

## 2025-07-30 PROCEDURE — 99214 OFFICE O/P EST MOD 30 MIN: CPT

## 2025-07-31 DIAGNOSIS — E03.8 OTHER SPECIFIED HYPOTHYROIDISM: ICD-10-CM

## 2025-07-31 LAB
25(OH)D3 SERPL-MCNC: 42 NG/ML
ALBUMIN SERPL ELPH-MCNC: 4.7 G/DL
ALP BLD-CCNC: 49 U/L
ALT SERPL-CCNC: 21 U/L
ANION GAP SERPL CALC-SCNC: 18 MMOL/L
AST SERPL-CCNC: 20 U/L
BASOPHILS # BLD AUTO: 0.02 K/UL
BASOPHILS NFR BLD AUTO: 0.5 %
BILIRUB SERPL-MCNC: 0.3 MG/DL
BUN SERPL-MCNC: 11 MG/DL
CALCIUM SERPL-MCNC: 9.7 MG/DL
CHLORIDE SERPL-SCNC: 101 MMOL/L
CHOLEST SERPL-MCNC: 235 MG/DL
CO2 SERPL-SCNC: 21 MMOL/L
CREAT SERPL-MCNC: 0.96 MG/DL
EGFRCR SERPLBLD CKD-EPI 2021: 101 ML/MIN/1.73M2
EOSINOPHIL # BLD AUTO: 0.16 K/UL
EOSINOPHIL NFR BLD AUTO: 4.2 %
ESTIMATED AVERAGE GLUCOSE: 111 MG/DL
GLUCOSE SERPL-MCNC: 86 MG/DL
HBA1C MFR BLD HPLC: 5.5 %
HCT VFR BLD CALC: 43.4 %
HDLC SERPL-MCNC: 83 MG/DL
HGB BLD-MCNC: 14.2 G/DL
IMM GRANULOCYTES NFR BLD AUTO: 0.3 %
LDLC SERPL-MCNC: 141 MG/DL
LYMPHOCYTES # BLD AUTO: 1.74 K/UL
LYMPHOCYTES NFR BLD AUTO: 45.2 %
MAN DIFF?: NORMAL
MCHC RBC-ENTMCNC: 31.1 PG
MCHC RBC-ENTMCNC: 32.7 G/DL
MCV RBC AUTO: 95.2 FL
MONOCYTES # BLD AUTO: 0.39 K/UL
MONOCYTES NFR BLD AUTO: 10.1 %
NEUTROPHILS # BLD AUTO: 1.53 K/UL
NEUTROPHILS NFR BLD AUTO: 39.7 %
NONHDLC SERPL-MCNC: 152 MG/DL
PLATELET # BLD AUTO: 211 K/UL
POTASSIUM SERPL-SCNC: 4.2 MMOL/L
PROT SERPL-MCNC: 7 G/DL
PSA SERPL-MCNC: 0.56 NG/ML
RBC # BLD: 4.56 M/UL
RBC # FLD: 12.6 %
SODIUM SERPL-SCNC: 140 MMOL/L
T3 SERPL-MCNC: 125 NG/DL
T3FREE SERPL-MCNC: 3.66 PG/ML
T4 FREE SERPL-MCNC: 1.3 NG/DL
T4 SERPL-MCNC: 7.2 UG/DL
TRIGL SERPL-MCNC: 67 MG/DL
TSH SERPL-ACNC: 0.24 UIU/ML
WBC # FLD AUTO: 3.85 K/UL